# Patient Record
Sex: FEMALE | Race: WHITE | NOT HISPANIC OR LATINO | URBAN - METROPOLITAN AREA
[De-identification: names, ages, dates, MRNs, and addresses within clinical notes are randomized per-mention and may not be internally consistent; named-entity substitution may affect disease eponyms.]

---

## 2023-02-24 PROBLEM — M79.89 SOFT TISSUE MASS: Status: ACTIVE | Noted: 2023-02-24

## 2023-02-24 PROBLEM — E53.8 B12 DEFICIENCY: Status: ACTIVE | Noted: 2023-02-24

## 2023-02-24 PROBLEM — M15.9 GENERALIZED OSTEOARTHRITIS OF MULTIPLE SITES: Status: ACTIVE | Noted: 2023-02-24

## 2023-02-24 PROBLEM — D17.0 LIPOMA OF FOREHEAD: Status: ACTIVE | Noted: 2023-02-24

## 2023-02-24 PROBLEM — J44.9 COPD (CHRONIC OBSTRUCTIVE PULMONARY DISEASE) (MULTI): Status: ACTIVE | Noted: 2023-02-24

## 2023-02-24 PROBLEM — M77.32 CALCANEAL SPUR, LEFT: Status: ACTIVE | Noted: 2023-02-24

## 2023-02-24 PROBLEM — M25.552 LEFT HIP PAIN: Status: ACTIVE | Noted: 2023-02-24

## 2023-02-24 PROBLEM — M54.10 RADICULOPATHY OF ARM: Status: ACTIVE | Noted: 2023-02-24

## 2023-02-24 PROBLEM — M25.511 RIGHT SHOULDER PAIN: Status: ACTIVE | Noted: 2023-02-24

## 2023-02-24 PROBLEM — R53.83 FATIGUE: Status: ACTIVE | Noted: 2023-02-24

## 2023-02-24 PROBLEM — K21.9 GERD (GASTROESOPHAGEAL REFLUX DISEASE): Status: ACTIVE | Noted: 2023-02-24

## 2023-02-24 PROBLEM — M47.812 OSTEOARTHRITIS OF NECK: Status: ACTIVE | Noted: 2023-02-24

## 2023-02-24 PROBLEM — F32.A DEPRESSION: Status: ACTIVE | Noted: 2023-02-24

## 2023-02-24 PROBLEM — E07.9 LESION OF THYROID GLAND: Status: ACTIVE | Noted: 2023-02-24

## 2023-02-24 PROBLEM — M25.561 KNEE PAIN, RIGHT: Status: ACTIVE | Noted: 2023-02-24

## 2023-02-24 PROBLEM — L98.9 BENIGN SKIN GROWTH: Status: ACTIVE | Noted: 2023-02-24

## 2023-02-24 PROBLEM — D50.9 IRON DEFICIENCY ANEMIA: Status: ACTIVE | Noted: 2023-02-24

## 2023-02-24 PROBLEM — D17.21 LIPOMA OF RIGHT UPPER EXTREMITY: Status: ACTIVE | Noted: 2023-02-24

## 2023-02-24 PROBLEM — K62.5 BRIGHT RED RECTAL BLEEDING: Status: ACTIVE | Noted: 2023-02-24

## 2023-02-24 PROBLEM — E11.9 DIABETES MELLITUS, TYPE 2 (MULTI): Status: ACTIVE | Noted: 2023-02-24

## 2023-02-24 PROBLEM — E55.9 VITAMIN D DEFICIENCY: Status: ACTIVE | Noted: 2023-02-24

## 2023-02-24 PROBLEM — E83.42 HYPOMAGNESEMIA: Status: ACTIVE | Noted: 2023-02-24

## 2023-02-24 PROBLEM — M54.50 LOW BACK PAIN: Status: ACTIVE | Noted: 2023-02-24

## 2023-02-24 PROBLEM — M79.672 LEFT FOOT PAIN: Status: ACTIVE | Noted: 2023-02-24

## 2023-02-24 PROBLEM — I48.91 ATRIAL FIBRILLATION (MULTI): Status: ACTIVE | Noted: 2023-02-24

## 2023-02-24 PROBLEM — N64.4 MASTODYNIA OF LEFT BREAST: Status: ACTIVE | Noted: 2023-02-24

## 2023-02-24 PROBLEM — G89.29 CHRONIC RIGHT-SIDED THORACIC BACK PAIN: Status: ACTIVE | Noted: 2023-02-24

## 2023-02-24 PROBLEM — R93.7 ABNORMAL X-RAY OF CERVICAL SPINE: Status: ACTIVE | Noted: 2023-02-24

## 2023-02-24 PROBLEM — N28.9 RENAL INSUFFICIENCY: Status: ACTIVE | Noted: 2023-02-24

## 2023-02-24 PROBLEM — E11.22 CKD STAGE 3 SECONDARY TO DIABETES (MULTI): Status: ACTIVE | Noted: 2023-02-24

## 2023-02-24 PROBLEM — M23.91 INTERNAL DERANGEMENT OF RIGHT KNEE: Status: ACTIVE | Noted: 2023-02-24

## 2023-02-24 PROBLEM — I10 HTN (HYPERTENSION): Status: ACTIVE | Noted: 2023-02-24

## 2023-02-24 PROBLEM — M54.6 CHRONIC RIGHT-SIDED THORACIC BACK PAIN: Status: ACTIVE | Noted: 2023-02-24

## 2023-02-24 PROBLEM — E04.2 MULTINODULAR GOITER: Status: ACTIVE | Noted: 2023-02-24

## 2023-02-24 PROBLEM — I51.7 CARDIOMEGALY: Status: ACTIVE | Noted: 2023-02-24

## 2023-02-24 PROBLEM — E78.5 HYPERLIPIDEMIA: Status: ACTIVE | Noted: 2023-02-24

## 2023-02-24 PROBLEM — T07.XXXA MULTIPLE CONTUSIONS: Status: ACTIVE | Noted: 2023-02-24

## 2023-02-24 PROBLEM — N18.30 CKD STAGE 3 SECONDARY TO DIABETES (MULTI): Status: ACTIVE | Noted: 2023-02-24

## 2023-02-24 PROBLEM — E66.01 MORBID OBESITY WITH BMI OF 50.0-59.9, ADULT (MULTI): Status: ACTIVE | Noted: 2023-02-24

## 2023-02-24 PROBLEM — M54.2 NECK PAIN ON RIGHT SIDE: Status: ACTIVE | Noted: 2023-02-24

## 2023-02-24 PROBLEM — R50.9 FEVER OF UNKNOWN ORIGIN (FUO): Status: ACTIVE | Noted: 2023-02-24

## 2023-02-24 LAB
ALANINE AMINOTRANSFERASE (SGPT) (U/L) IN SER/PLAS: 19 U/L (ref 7–45)
ALBUMIN (G/DL) IN SER/PLAS: 4.3 G/DL (ref 3.4–5)
ALKALINE PHOSPHATASE (U/L) IN SER/PLAS: 64 U/L (ref 33–136)
ANION GAP IN SER/PLAS: 14 MMOL/L (ref 10–20)
ASPARTATE AMINOTRANSFERASE (SGOT) (U/L) IN SER/PLAS: 23 U/L (ref 9–39)
BILIRUBIN TOTAL (MG/DL) IN SER/PLAS: 0.4 MG/DL (ref 0–1.2)
CALCIDIOL (25 OH VITAMIN D3) (NG/ML) IN SER/PLAS: 55 NG/ML
CALCIUM (MG/DL) IN SER/PLAS: 9.7 MG/DL (ref 8.6–10.3)
CARBON DIOXIDE, TOTAL (MMOL/L) IN SER/PLAS: 27 MMOL/L (ref 21–32)
CHLORIDE (MMOL/L) IN SER/PLAS: 102 MMOL/L (ref 98–107)
CHOLESTEROL (MG/DL) IN SER/PLAS: 153 MG/DL (ref 0–199)
CHOLESTEROL IN HDL (MG/DL) IN SER/PLAS: 50.3 MG/DL
CHOLESTEROL/HDL RATIO: 3
COBALAMIN (VITAMIN B12) (PG/ML) IN SER/PLAS: >7500 PG/ML (ref 211–911)
CREATININE (MG/DL) IN SER/PLAS: 0.89 MG/DL (ref 0.5–1.05)
ERYTHROCYTE DISTRIBUTION WIDTH (RATIO) BY AUTOMATED COUNT: 14.4 % (ref 11.5–14.5)
ERYTHROCYTE MEAN CORPUSCULAR HEMOGLOBIN CONCENTRATION (G/DL) BY AUTOMATED: 31.1 G/DL (ref 32–36)
ERYTHROCYTE MEAN CORPUSCULAR VOLUME (FL) BY AUTOMATED COUNT: 90 FL (ref 80–100)
ERYTHROCYTES (10*6/UL) IN BLOOD BY AUTOMATED COUNT: 4.16 X10E12/L (ref 4–5.2)
GFR FEMALE: 70 ML/MIN/1.73M2
GLUCOSE (MG/DL) IN SER/PLAS: 200 MG/DL (ref 74–99)
HEMATOCRIT (%) IN BLOOD BY AUTOMATED COUNT: 37.6 % (ref 36–46)
HEMOGLOBIN (G/DL) IN BLOOD: 11.7 G/DL (ref 12–16)
INR IN PPP BY COAGULATION ASSAY: 1.6 (ref 0.9–1.1)
LDL: 66 MG/DL (ref 0–99)
LEUKOCYTES (10*3/UL) IN BLOOD BY AUTOMATED COUNT: 5.1 X10E9/L (ref 4.4–11.3)
PLATELETS (10*3/UL) IN BLOOD AUTOMATED COUNT: 206 X10E9/L (ref 150–450)
POTASSIUM (MMOL/L) IN SER/PLAS: 4.1 MMOL/L (ref 3.5–5.3)
PROTEIN TOTAL: 7 G/DL (ref 6.4–8.2)
PROTHROMBIN TIME (PT) IN PPP BY COAGULATION ASSAY: 18.7 SEC (ref 9.8–13.4)
SODIUM (MMOL/L) IN SER/PLAS: 139 MMOL/L (ref 136–145)
TRIGLYCERIDE (MG/DL) IN SER/PLAS: 186 MG/DL (ref 0–149)
UREA NITROGEN (MG/DL) IN SER/PLAS: 17 MG/DL (ref 6–23)
VLDL: 37 MG/DL (ref 0–40)

## 2023-02-24 RX ORDER — WARFARIN 4 MG/1
1 TABLET ORAL DAILY
COMMUNITY
End: 2023-05-17 | Stop reason: DRUGHIGH

## 2023-02-24 RX ORDER — HYDROCHLOROTHIAZIDE 25 MG/1
25 TABLET ORAL DAILY
COMMUNITY
Start: 2017-01-12 | End: 2023-09-05

## 2023-02-24 RX ORDER — CALCIUM CARBONATE 600 MG
2 TABLET ORAL DAILY
COMMUNITY
Start: 2022-02-22

## 2023-02-24 RX ORDER — FERROUS SULFATE 325(65) MG
1 TABLET ORAL DAILY
COMMUNITY
Start: 2018-02-05

## 2023-02-24 RX ORDER — CYANOCOBALAMIN 1000 UG/ML
1000 INJECTION, SOLUTION INTRAMUSCULAR; SUBCUTANEOUS
COMMUNITY
Start: 2021-07-20

## 2023-02-24 RX ORDER — LANOLIN ALCOHOL/MO/W.PET/CERES
1 CREAM (GRAM) TOPICAL DAILY
COMMUNITY
Start: 2020-10-26

## 2023-02-24 RX ORDER — SOTALOL HYDROCHLORIDE 120 MG/1
1 TABLET ORAL 2 TIMES DAILY
COMMUNITY
Start: 2017-01-12 | End: 2023-03-15

## 2023-02-24 RX ORDER — ROSUVASTATIN CALCIUM 20 MG/1
1 TABLET, COATED ORAL EVERY EVENING
COMMUNITY
Start: 2016-03-15 | End: 2023-06-09

## 2023-02-24 RX ORDER — SERTRALINE HYDROCHLORIDE 100 MG/1
200 TABLET, FILM COATED ORAL NIGHTLY
COMMUNITY
Start: 2017-01-12 | End: 2023-03-14

## 2023-02-24 RX ORDER — VIT C/E/ZN/COPPR/LUTEIN/ZEAXAN 250MG-90MG
1 CAPSULE ORAL DAILY
COMMUNITY
Start: 2020-10-26

## 2023-02-24 RX ORDER — PANTOPRAZOLE SODIUM 40 MG/1
TABLET, DELAYED RELEASE ORAL
COMMUNITY
Start: 2017-10-15

## 2023-02-24 RX ORDER — AMLODIPINE BESYLATE 5 MG/1
5 TABLET ORAL DAILY
COMMUNITY
Start: 2019-07-22 | End: 2023-03-15

## 2023-02-24 RX ORDER — GLIPIZIDE 5 MG/1
2 TABLET ORAL 2 TIMES DAILY
COMMUNITY
Start: 2017-01-26 | End: 2023-05-26 | Stop reason: SDUPTHER

## 2023-02-24 RX ORDER — BLOOD SUGAR DIAGNOSTIC
STRIP MISCELLANEOUS
COMMUNITY
Start: 2018-03-13

## 2023-02-24 RX ORDER — ASPIRIN 81 MG/1
1 TABLET ORAL DAILY
COMMUNITY
Start: 2021-07-20

## 2023-02-24 RX ORDER — METFORMIN HYDROCHLORIDE 500 MG/1
TABLET ORAL
COMMUNITY
Start: 2017-01-12 | End: 2023-03-15

## 2023-02-24 RX ORDER — PIOGLITAZONEHYDROCHLORIDE 45 MG/1
45 TABLET ORAL DAILY
COMMUNITY
Start: 2017-12-28 | End: 2023-05-12

## 2023-02-24 RX ORDER — METAXALONE 800 MG/1
1 TABLET ORAL 3 TIMES DAILY PRN
COMMUNITY
Start: 2022-02-22

## 2023-02-24 RX ORDER — HUMAN INSULIN 100 [IU]/ML
INJECTION, SOLUTION SUBCUTANEOUS
COMMUNITY
Start: 2019-11-11

## 2023-02-24 RX ORDER — ISOPROPYL ALCOHOL 70 ML/100ML
SWAB TOPICAL
COMMUNITY

## 2023-02-24 RX ORDER — OXYCODONE HYDROCHLORIDE 5 MG/1
CAPSULE ORAL EVERY 6 HOURS PRN
COMMUNITY
Start: 2022-02-22

## 2023-02-24 RX ORDER — MULTIVIT-MIN/FA/LYCOPEN/LUTEIN .4-300-25
1 TABLET ORAL DAILY
COMMUNITY
Start: 2022-02-22

## 2023-02-24 RX ORDER — BLOOD-GLUCOSE CONTROL, NORMAL
EACH MISCELLANEOUS
COMMUNITY

## 2023-02-24 RX ORDER — BLOOD SUGAR DIAGNOSTIC
STRIP MISCELLANEOUS
COMMUNITY
Start: 2020-07-21

## 2023-03-08 ENCOUNTER — TELEPHONE (OUTPATIENT)
Dept: PRIMARY CARE | Facility: CLINIC | Age: 68
End: 2023-03-08
Payer: MEDICARE

## 2023-03-13 ENCOUNTER — OFFICE VISIT (OUTPATIENT)
Dept: PRIMARY CARE | Facility: CLINIC | Age: 68
End: 2023-03-13
Payer: MEDICARE

## 2023-03-13 VITALS
DIASTOLIC BLOOD PRESSURE: 82 MMHG | WEIGHT: 293 LBS | SYSTOLIC BLOOD PRESSURE: 155 MMHG | OXYGEN SATURATION: 95 % | TEMPERATURE: 97.9 F | RESPIRATION RATE: 16 BRPM | BODY MASS INDEX: 57.93 KG/M2 | HEART RATE: 60 BPM

## 2023-03-13 DIAGNOSIS — L03.116 CELLULITIS AND ABSCESS OF LEFT LEG: Primary | ICD-10-CM

## 2023-03-13 DIAGNOSIS — L02.416 CELLULITIS AND ABSCESS OF LEFT LEG: Primary | ICD-10-CM

## 2023-03-13 PROCEDURE — 99213 OFFICE O/P EST LOW 20 MIN: CPT | Performed by: FAMILY MEDICINE

## 2023-03-13 RX ORDER — CEPHALEXIN 500 MG/1
500 CAPSULE ORAL 3 TIMES DAILY
Qty: 30 CAPSULE | Refills: 0 | Status: SHIPPED | OUTPATIENT
Start: 2023-03-13 | End: 2023-03-23

## 2023-03-13 NOTE — PROGRESS NOTES
Subjective   Patient ID: Bree Elam is a 67 y.o. female who presents for Mass (Painful lump/sore on upper outer left thigh. X 4 days).    HPI   Patient comes in today complaining of a painfully sore lump on the upper outer left thigh that has been going on for at least 4 days.  She states it started out as more of a small red bump and she has pictures on her phone of the area.  It has progressed and now has a surrounding hard red area and is tender to touch.  She is otherwise without complaints.  Review of Systems   All other systems reviewed and are negative.      Objective   /82   Pulse 60   Temp 36.6 °C (97.9 °F)   Resp 16   Wt (!) 148 kg (327 lb)   LMP  (LMP Unknown)   SpO2 95%   BMI 57.93 kg/m²     Physical Exam  Vitals reviewed.   Constitutional:       Appearance: She is well-developed. She is obese.      Comments: Patient is super morbidly obese   HENT:      Head: Normocephalic and atraumatic.      Right Ear: Tympanic membrane, ear canal and external ear normal.      Left Ear: Tympanic membrane, ear canal and external ear normal.      Nose: Nose normal.      Mouth/Throat:      Mouth: Mucous membranes are moist.      Pharynx: Oropharynx is clear.   Eyes:      Extraocular Movements: Extraocular movements intact.      Conjunctiva/sclera: Conjunctivae normal.      Pupils: Pupils are equal, round, and reactive to light.   Cardiovascular:      Rate and Rhythm: Normal rate and regular rhythm.      Heart sounds: Normal heart sounds. No murmur heard.  Pulmonary:      Effort: Pulmonary effort is normal.      Breath sounds: Normal breath sounds. No wheezing.   Abdominal:      General: Abdomen is flat. Bowel sounds are normal.      Palpations: Abdomen is soft.   Musculoskeletal:         General: Swelling and tenderness (Patient has red indurated area on upper outer left thigh which is tender to touch and approximately an inch in diameter.) present. Normal range of motion.   Skin:     General: Skin is  warm and dry.   Neurological:      General: No focal deficit present.      Mental Status: She is alert and oriented to person, place, and time. Mental status is at baseline.   Psychiatric:         Mood and Affect: Mood normal.         Behavior: Behavior normal.         Thought Content: Thought content normal.         Judgment: Judgment normal.         Assessment/Plan   Problem List Items Addressed This Visit    None  Visit Diagnoses       Cellulitis and abscess of left leg    -  Primary    Relevant Medications    cephalexin (Keflex) 500 mg capsule        Use warm compresses on affected area for 10-15 minutes twice a day for the next 7-10 days. May use gentle pressure along the sides of the wound to exude any pus from the lesion.  Take antibiotics as directed.  Use Advil/Aleve or Tylenol as directed for pain and swelling.  RTC in 10 days for recheck if not better, sooner if condition worsens.  Medication list reconciled.  Thank you for visiting today!      Professional services: Some of this note was completed using Dragon voice technology and sometimes the software misinterprets words. This may include unintended errors with respect to translation of words, typographical errors or grammar errors which may not have been identified prior to finalization of the chart note. Please take this into account when reading the note. Thank you.

## 2023-03-14 DIAGNOSIS — F32.A DEPRESSION, UNSPECIFIED DEPRESSION TYPE: Primary | ICD-10-CM

## 2023-03-14 RX ORDER — SERTRALINE HYDROCHLORIDE 100 MG/1
TABLET, FILM COATED ORAL
Qty: 180 TABLET | Refills: 0 | Status: SHIPPED | OUTPATIENT
Start: 2023-03-14 | End: 2023-06-09

## 2023-03-24 ENCOUNTER — OFFICE VISIT (OUTPATIENT)
Dept: PRIMARY CARE | Facility: CLINIC | Age: 68
End: 2023-03-24
Payer: MEDICARE

## 2023-03-24 VITALS
TEMPERATURE: 98.3 F | WEIGHT: 293 LBS | OXYGEN SATURATION: 94 % | BODY MASS INDEX: 57.39 KG/M2 | DIASTOLIC BLOOD PRESSURE: 81 MMHG | SYSTOLIC BLOOD PRESSURE: 159 MMHG | RESPIRATION RATE: 20 BRPM | HEART RATE: 60 BPM

## 2023-03-24 DIAGNOSIS — F32.A DEPRESSION, UNSPECIFIED DEPRESSION TYPE: ICD-10-CM

## 2023-03-24 DIAGNOSIS — I48.91 ATRIAL FIBRILLATION, UNSPECIFIED TYPE (MULTI): ICD-10-CM

## 2023-03-24 DIAGNOSIS — L03.116 CELLULITIS AND ABSCESS OF LEFT LEG: ICD-10-CM

## 2023-03-24 DIAGNOSIS — L02.416 CELLULITIS AND ABSCESS OF LEFT LEG: ICD-10-CM

## 2023-03-24 DIAGNOSIS — F43.9 STRESS AT HOME: ICD-10-CM

## 2023-03-24 DIAGNOSIS — I10 HYPERTENSION, UNSPECIFIED TYPE: ICD-10-CM

## 2023-03-24 DIAGNOSIS — Z79.01 WARFARIN ANTICOAGULATION: Primary | ICD-10-CM

## 2023-03-24 PROCEDURE — 85610 PROTHROMBIN TIME: CPT | Performed by: FAMILY MEDICINE

## 2023-03-24 PROCEDURE — 36416 COLLJ CAPILLARY BLOOD SPEC: CPT | Performed by: FAMILY MEDICINE

## 2023-03-24 PROCEDURE — 99214 OFFICE O/P EST MOD 30 MIN: CPT | Performed by: FAMILY MEDICINE

## 2023-03-24 RX ORDER — AMLODIPINE BESYLATE 10 MG/1
10 TABLET ORAL DAILY
COMMUNITY
End: 2023-03-24

## 2023-03-24 RX ORDER — CEPHALEXIN 500 MG/1
500 CAPSULE ORAL 3 TIMES DAILY
Qty: 21 CAPSULE | Refills: 0 | Status: SHIPPED | OUTPATIENT
Start: 2023-03-24 | End: 2023-03-31

## 2023-03-24 RX ORDER — AMLODIPINE BESYLATE 5 MG/1
10 TABLET ORAL DAILY
Qty: 90 TABLET | Refills: 1 | Status: CANCELLED | OUTPATIENT
Start: 2023-03-24

## 2023-03-24 RX ORDER — AMLODIPINE BESYLATE 5 MG/1
10 TABLET ORAL DAILY
Qty: 90 TABLET | Refills: 1
Start: 2023-03-24 | End: 2023-05-17 | Stop reason: SDUPTHER

## 2023-03-24 NOTE — PROGRESS NOTES
Subjective   Patient ID: Bree Elam is a 67 y.o. female who presents for Follow-up (For INR ) and B12 Injection.    HPI   Patient comes in today for follow-up on knee cellulitis of her left thigh as well as recheck of the INR.  Her INR was good today at 2.4.  The cellulitis is improving but not yet resolved.  She does have some stress at home as her son recently moved in with her.    3/13/2023  Patient comes in today complaining of a painfully sore lump on the upper outer left thigh that has been going on for at least 4 days.  She states it started out as more of a small red bump and she has pictures on her phone of the area.  It has progressed and now has a surrounding hard red area and is tender to touch.  She is otherwise without complaints.  Review of Systems   All other systems reviewed and are negative.      Objective   /81   Pulse 60   Temp 36.8 °C (98.3 °F)   Resp 20   Wt (!) 147 kg (324 lb)   LMP  (LMP Unknown)   SpO2 94%   BMI 57.39 kg/m²     Physical Exam  Constitutional:       Appearance: She is well-developed. She is obese.   HENT:      Head: Normocephalic and atraumatic.      Right Ear: Tympanic membrane, ear canal and external ear normal.      Left Ear: Tympanic membrane, ear canal and external ear normal.      Nose: Nose normal.      Mouth/Throat:      Mouth: Mucous membranes are moist.      Pharynx: Oropharynx is clear.   Eyes:      Extraocular Movements: Extraocular movements intact.      Conjunctiva/sclera: Conjunctivae normal.      Pupils: Pupils are equal, round, and reactive to light.   Cardiovascular:      Rate and Rhythm: Normal rate and regular rhythm.      Heart sounds: Normal heart sounds. No murmur heard.  Pulmonary:      Effort: Pulmonary effort is normal.      Breath sounds: Normal breath sounds. No wheezing.   Abdominal:      General: Abdomen is flat. Bowel sounds are normal.      Palpations: Abdomen is soft.   Musculoskeletal:         General: Normal range of  motion.   Skin:     General: Skin is warm and dry.      Findings: Erythema (Resolving cellulitis left anterior thigh) present.   Neurological:      General: No focal deficit present.      Mental Status: She is alert and oriented to person, place, and time. Mental status is at baseline.   Psychiatric:         Mood and Affect: Mood normal.         Behavior: Behavior normal.         Thought Content: Thought content normal.         Judgment: Judgment normal.         Assessment/Plan   Problem List Items Addressed This Visit       Atrial fibrillation (CMS/HCC) - Primary    Relevant Medications    amLODIPine (Norvasc) 5 mg tablet    Depression    HTN (hypertension)    Relevant Medications    amLODIPine (Norvasc) 5 mg tablet    Cellulitis and abscess of left leg    Relevant Medications    cephalexin (Keflex) 500 mg capsule    Stress at home   Take new dose of amlodipine medication as directed.  Continue other medications as directed.  RTC in one month for recheck, sooner should problems arise.  Medication list reconciled.  Thank you for visiting today!      Professional services: Some of this note was completed using Dragon voice technology and sometimes the software misinterprets words. This may include unintended errors with respect to translation of words, typographical errors or grammar errors which may not have been identified prior to finalization of the chart note. Please take this into account when reading the note. Thank you.

## 2023-03-27 PROBLEM — F43.9 STRESS AT HOME: Status: ACTIVE | Noted: 2023-03-27

## 2023-03-28 LAB — POC INR: 2.4 (ref 0.9–1.1)

## 2023-04-17 ENCOUNTER — OFFICE VISIT (OUTPATIENT)
Dept: PRIMARY CARE | Facility: CLINIC | Age: 68
End: 2023-04-17
Payer: MEDICARE

## 2023-04-17 VITALS
OXYGEN SATURATION: 94 % | DIASTOLIC BLOOD PRESSURE: 76 MMHG | BODY MASS INDEX: 57.93 KG/M2 | SYSTOLIC BLOOD PRESSURE: 184 MMHG | WEIGHT: 293 LBS | RESPIRATION RATE: 20 BRPM | HEART RATE: 60 BPM | TEMPERATURE: 98.1 F

## 2023-04-17 DIAGNOSIS — I48.91 ATRIAL FIBRILLATION, UNSPECIFIED TYPE (MULTI): ICD-10-CM

## 2023-04-17 DIAGNOSIS — R31.9 URINARY TRACT INFECTION WITH HEMATURIA, SITE UNSPECIFIED: Primary | ICD-10-CM

## 2023-04-17 DIAGNOSIS — N39.0 URINARY TRACT INFECTION WITH HEMATURIA, SITE UNSPECIFIED: Primary | ICD-10-CM

## 2023-04-17 LAB
POC APPEARANCE, URINE: CLEAR
POC BILIRUBIN, URINE: NEGATIVE
POC BLOOD, URINE: ABNORMAL
POC COLOR, URINE: ABNORMAL
POC GLUCOSE, URINE: NEGATIVE MG/DL
POC INR: 4.8 (ref 0.9–1.1)
POC KETONES, URINE: NEGATIVE MG/DL
POC LEUKOCYTES, URINE: ABNORMAL
POC NITRITE,URINE: NEGATIVE
POC PH, URINE: 6.5 PH
POC PROTEIN, URINE: NEGATIVE MG/DL
POC SPECIFIC GRAVITY, URINE: 1.01
POC UROBILINOGEN, URINE: 0.2 EU/DL

## 2023-04-17 PROCEDURE — 99214 OFFICE O/P EST MOD 30 MIN: CPT | Performed by: FAMILY MEDICINE

## 2023-04-17 PROCEDURE — 81003 URINALYSIS AUTO W/O SCOPE: CPT | Performed by: FAMILY MEDICINE

## 2023-04-17 PROCEDURE — 85610 PROTHROMBIN TIME: CPT | Performed by: FAMILY MEDICINE

## 2023-04-17 PROCEDURE — 87086 URINE CULTURE/COLONY COUNT: CPT

## 2023-04-17 RX ORDER — CIPROFLOXACIN 500 MG/1
500 TABLET ORAL 2 TIMES DAILY
Qty: 14 TABLET | Refills: 0 | Status: SHIPPED | OUTPATIENT
Start: 2023-04-17 | End: 2023-04-24

## 2023-04-17 NOTE — PROGRESS NOTES
Subjective   Patient ID: Bree Elam is a 67 y.o. female who presents for Follow-up and UTI (Since Friday afternoon, frequency and pain).    HPI   Patient comes in today complaining of urinary tract symptoms.  She is having urinary frequency, urgency and burning.  She denies flank pain or other symptoms.  She has been having the symptoms for the past few days.    Also she is here today for recheck of her INR which is high today at 4.8.    3/24/2023  Patient comes in today for follow-up on knee cellulitis of her left thigh as well as recheck of the INR.  Her INR was good today at 2.4.  The cellulitis is improving but not yet resolved.  She does have some stress at home as her son recently moved in with her.    3/13/2023  Patient comes in today complaining of a painfully sore lump on the upper outer left thigh that has been going on for at least 4 days.  She states it started out as more of a small red bump and she has pictures on her phone of the area.  It has progressed and now has a surrounding hard red area and is tender to touch.  She is otherwise without complaints.    Review of Systems   All other systems reviewed and are negative.      Objective   BP (!) 184/76   Pulse 60   Temp 36.7 °C (98.1 °F)   Resp 20   Wt 148 kg (327 lb)   LMP  (LMP Unknown)   SpO2 94%   BMI 57.93 kg/m²     Physical Exam  Vitals reviewed.   Constitutional:       Appearance: She is well-developed.   HENT:      Head: Normocephalic and atraumatic.      Right Ear: Tympanic membrane, ear canal and external ear normal.      Left Ear: Tympanic membrane, ear canal and external ear normal.      Nose: Nose normal.      Mouth/Throat:      Mouth: Mucous membranes are moist.      Pharynx: Oropharynx is clear.   Eyes:      Extraocular Movements: Extraocular movements intact.      Conjunctiva/sclera: Conjunctivae normal.      Pupils: Pupils are equal, round, and reactive to light.   Cardiovascular:      Rate and Rhythm: Normal rate and  regular rhythm.      Heart sounds: Normal heart sounds. No murmur heard.  Pulmonary:      Effort: Pulmonary effort is normal.      Breath sounds: Normal breath sounds. No wheezing.   Abdominal:      General: Abdomen is flat. Bowel sounds are normal.      Palpations: Abdomen is soft.   Musculoskeletal:         General: Normal range of motion.   Skin:     General: Skin is warm and dry.   Neurological:      General: No focal deficit present.      Mental Status: She is alert and oriented to person, place, and time. Mental status is at baseline.   Psychiatric:         Mood and Affect: Mood normal.         Behavior: Behavior normal.         Thought Content: Thought content normal.         Judgment: Judgment normal.         Assessment/Plan   Problem List Items Addressed This Visit       Atrial fibrillation (CMS/HCC)    Relevant Orders    POCT INR manually resulted (Completed)    Urinary tract infection with hematuria - Primary    Relevant Medications    ciprofloxacin (Cipro) 500 mg tablet    Other Relevant Orders    POCT UA Automated manually resulted (Completed)    Urine Culture   Patient encouraged to drink lots of fluids and cranberry juice.  Will notify patient of urine culture results when available.  Use meds as directed.  Return to clinic if symptoms do not improve in 7-10 days, sooner if condition worsens.  Patient instructed to not take her Coumadin today, tomorrow or Wednesday and resume on Thursday.  Medication list reconciled.  Thank you for visiting today!      Professional services: Some of this note was completed using Dragon voice technology and sometimes the software misinterprets words. This may include unintended errors with respect to translation of words, typographical errors or grammar errors which may not have been identified prior to finalization of the chart note. Please take this into account when reading the note. Thank you.

## 2023-04-18 ENCOUNTER — TELEPHONE (OUTPATIENT)
Dept: PRIMARY CARE | Facility: CLINIC | Age: 68
End: 2023-04-18
Payer: MEDICARE

## 2023-04-18 DIAGNOSIS — R31.9 URINARY TRACT INFECTION WITH HEMATURIA, SITE UNSPECIFIED: Primary | ICD-10-CM

## 2023-04-18 DIAGNOSIS — N39.0 URINARY TRACT INFECTION WITH HEMATURIA, SITE UNSPECIFIED: Primary | ICD-10-CM

## 2023-04-18 LAB — URINE CULTURE: NORMAL

## 2023-04-18 NOTE — TELEPHONE ENCOUNTER
Please call [facility] at [phone] they are stating that there is an interaction with the medicine that was prescribed and wants to clarify the medication.    Thank You

## 2023-04-19 NOTE — TELEPHONE ENCOUNTER
[facility] pharmacist states there are interactions with cipro and the warfarin. Please advise.   [Carbohydrate Consistent Diet] : carbohydrate consistent diet [Diabetes Foot Care] : diabetes foot care [Long Term Vascular Complications] : long term vascular complications of diabetes [Self Monitoring of Blood Glucose] : self monitoring of blood glucose [Retinopathy Screening] : Patient was referred to ophthalmology for retinopathy screening [FreeTextEntry1] : 64 y.o. female with h/o hypothyroidism, thyroid nodule, Type 2 DM, adrenal nodule and osteopenia.\par \par 1. Hypothyroidism- Will check TFTs and adjust Synthroid accordingly.\par \par 2. Thyroid nodule- Will check thyroid ultrasound in 2024 given nodule stability. Will continue to monitor.\par \par 3. Type 2 DM- Discussed pathophysiology and reviewed Hba1c and blood glucose goals. Good control with Hba1c of 6.3% today. Recommend increasing Metformin to 500 mg BID. Encouraged carbohydrate consistent diet and exercise. Will check CMP and urine microalb/cr ratio. Recommend follow up with ophthalmology. \par \par 4. Adrenal nodule- Hormonal work up is normal. Will repeat CT scan in 2024.\par \par 5. Osteopenia- DEXA scan performed in April 2021 shows spine -1.3 which is stable and left femoral neck -1.9 with 20% decrease however  total hip 0.8 which is stable. Patient is at average risk of fracture. Recommend monitoring and repeat DEXA scan in 1 year given more significant decrease at femoral neck though given stability of total hip unlikely this is accurate. Encouraged weight bearing activity. Discussed appropriate calcium and vitamin D intake. Will check 25 vitamin D level.\par \par 6. Fatigue- Will check CBC and vitamin B12 \par \par Follow up in 4 to 6 months

## 2023-04-20 RX ORDER — SULFAMETHOXAZOLE AND TRIMETHOPRIM 800; 160 MG/1; MG/1
1 TABLET ORAL 2 TIMES DAILY
Qty: 14 TABLET | Refills: 0 | Status: SHIPPED | OUTPATIENT
Start: 2023-04-20 | End: 2023-04-27

## 2023-04-24 ENCOUNTER — CLINICAL SUPPORT (OUTPATIENT)
Dept: PRIMARY CARE | Facility: CLINIC | Age: 68
End: 2023-04-24
Payer: MEDICARE

## 2023-04-24 DIAGNOSIS — I10 HYPERTENSION, UNSPECIFIED TYPE: ICD-10-CM

## 2023-04-24 DIAGNOSIS — Z79.01 WARFARIN ANTICOAGULATION: Primary | ICD-10-CM

## 2023-04-24 LAB — POC INR: 1.4 (ref 0.9–1.1)

## 2023-04-24 PROCEDURE — 85610 PROTHROMBIN TIME: CPT | Performed by: FAMILY MEDICINE

## 2023-04-24 RX ORDER — AMLODIPINE BESYLATE 5 MG/1
10 TABLET ORAL DAILY
Qty: 90 TABLET | Refills: 1 | Status: CANCELLED | OUTPATIENT
Start: 2023-04-24

## 2023-04-26 ENCOUNTER — TELEPHONE (OUTPATIENT)
Dept: PRIMARY CARE | Facility: CLINIC | Age: 68
End: 2023-04-26
Payer: MEDICARE

## 2023-04-26 NOTE — TELEPHONE ENCOUNTER
[facility] is calling stating that a prescription was sent in for Bactrim DS and that this medication interacts with the warfarin that she is on.  The are asking if it should be filled because the cipro was already given to the patient    Thank You

## 2023-04-26 NOTE — TELEPHONE ENCOUNTER
I did not know the pharmacy had finally filled the Cipro.  No, they should cancel the prescription for the Bactrim.  Thank you.

## 2023-04-28 ENCOUNTER — TELEPHONE (OUTPATIENT)
Dept: PRIMARY CARE | Facility: CLINIC | Age: 68
End: 2023-04-28
Payer: MEDICARE

## 2023-04-28 NOTE — TELEPHONE ENCOUNTER
----- Message from Edis Patel DO sent at 4/26/2023  7:36 AM EDT -----  Regarding: INR  Please notify patient of low INR.  Have her resume Coumadin as she was taking and recheck in 1 month.  ----- Message -----  From: Steve Strickland MA  Sent: 4/24/2023   1:10 PM EDT  To: Edis Patel DO

## 2023-05-12 DIAGNOSIS — E11.9 TYPE 2 DIABETES MELLITUS WITHOUT COMPLICATION, WITHOUT LONG-TERM CURRENT USE OF INSULIN (MULTI): Primary | ICD-10-CM

## 2023-05-12 RX ORDER — PIOGLITAZONEHYDROCHLORIDE 45 MG/1
TABLET ORAL
Qty: 90 TABLET | Refills: 1 | Status: SHIPPED | OUTPATIENT
Start: 2023-05-12 | End: 2023-11-14

## 2023-05-12 NOTE — TELEPHONE ENCOUNTER
Requested Prescriptions     Pending Prescriptions Disp Refills    pioglitazone (Actos) 45 mg tablet [Pharmacy Med Name: Pioglitazone HCl 45 MG Oral Tablet] 90 tablet 1     Sig: Take 1 tablet by mouth once daily

## 2023-05-17 ENCOUNTER — OFFICE VISIT (OUTPATIENT)
Dept: PRIMARY CARE | Facility: CLINIC | Age: 68
End: 2023-05-17
Payer: MEDICARE

## 2023-05-17 VITALS
RESPIRATION RATE: 20 BRPM | WEIGHT: 293 LBS | SYSTOLIC BLOOD PRESSURE: 114 MMHG | TEMPERATURE: 98.3 F | DIASTOLIC BLOOD PRESSURE: 77 MMHG | OXYGEN SATURATION: 93 % | HEART RATE: 60 BPM | BODY MASS INDEX: 57.04 KG/M2

## 2023-05-17 DIAGNOSIS — Z79.01 WARFARIN ANTICOAGULATION: Primary | ICD-10-CM

## 2023-05-17 DIAGNOSIS — M79.89 PAIN AND SWELLING OF RIGHT LOWER LEG: ICD-10-CM

## 2023-05-17 DIAGNOSIS — I10 HYPERTENSION, UNSPECIFIED TYPE: ICD-10-CM

## 2023-05-17 DIAGNOSIS — M79.661 PAIN AND SWELLING OF RIGHT LOWER LEG: ICD-10-CM

## 2023-05-17 DIAGNOSIS — E53.8 B12 DEFICIENCY: ICD-10-CM

## 2023-05-17 DIAGNOSIS — I48.91 ATRIAL FIBRILLATION, UNSPECIFIED TYPE (MULTI): ICD-10-CM

## 2023-05-17 DIAGNOSIS — I48.91 ATRIAL FIBRILLATION, UNSPECIFIED TYPE (MULTI): Primary | ICD-10-CM

## 2023-05-17 DIAGNOSIS — E11.9 TYPE 2 DIABETES MELLITUS WITHOUT COMPLICATION, WITHOUT LONG-TERM CURRENT USE OF INSULIN (MULTI): ICD-10-CM

## 2023-05-17 LAB — POC INR: 4.5 (ref 0.9–1.1)

## 2023-05-17 PROCEDURE — 3074F SYST BP LT 130 MM HG: CPT | Performed by: FAMILY MEDICINE

## 2023-05-17 PROCEDURE — 3078F DIAST BP <80 MM HG: CPT | Performed by: FAMILY MEDICINE

## 2023-05-17 PROCEDURE — 96372 THER/PROPH/DIAG INJ SC/IM: CPT | Performed by: FAMILY MEDICINE

## 2023-05-17 PROCEDURE — 99214 OFFICE O/P EST MOD 30 MIN: CPT | Performed by: FAMILY MEDICINE

## 2023-05-17 PROCEDURE — 1036F TOBACCO NON-USER: CPT | Performed by: FAMILY MEDICINE

## 2023-05-17 PROCEDURE — 85610 PROTHROMBIN TIME: CPT | Performed by: FAMILY MEDICINE

## 2023-05-17 PROCEDURE — 1159F MED LIST DOCD IN RCRD: CPT | Performed by: FAMILY MEDICINE

## 2023-05-17 RX ORDER — AMLODIPINE BESYLATE 10 MG/1
10 TABLET ORAL DAILY
Qty: 90 TABLET | Refills: 1 | Status: SHIPPED | OUTPATIENT
Start: 2023-05-17 | End: 2023-11-14

## 2023-05-17 RX ORDER — CYANOCOBALAMIN 1000 UG/ML
1000 INJECTION, SOLUTION INTRAMUSCULAR; SUBCUTANEOUS ONCE
Status: COMPLETED | OUTPATIENT
Start: 2023-05-17 | End: 2023-05-17

## 2023-05-17 RX ORDER — WARFARIN 3 MG/1
3 TABLET ORAL EVERY EVENING
Qty: 90 TABLET | Refills: 1 | Status: SHIPPED | OUTPATIENT
Start: 2023-05-17 | End: 2023-05-26 | Stop reason: SDUPTHER

## 2023-05-17 RX ADMIN — CYANOCOBALAMIN 1000 MCG: 1000 INJECTION, SOLUTION INTRAMUSCULAR; SUBCUTANEOUS at 08:54

## 2023-05-17 NOTE — PROGRESS NOTES
Subjective   Patient ID: Bree Elam is a 67 y.o. female who presents for Anticoagulation (Inr fu ).    HPI   Patient comes in today for checkup and monitoring of her medication. She has no voiced complaints and no evidence of bleeding or other problems from the Coumadin.  She was in to see her cardiologist Dr. Pantoja recently and it was recommended that she switch from Coumadin to Eliquis or Xarelto.  She is hesitant about doing so because of their cost.  She would like to think about it.  In the meantime her INR is high today at 4.5.    Patient fell going up her outside cement steps about 2 weeks ago and struck the right tibial region.  She had some swelling and redness initially but it is still painful today and still somewhat swollen.    4/17/2023  Patient comes in today complaining of urinary tract symptoms.  She is having urinary frequency, urgency and burning.  She denies flank pain or other symptoms.  She has been having the symptoms for the past few days.    Also she is here today for recheck of her INR which is high today at 4.8.    3/24/2023  Patient comes in today for follow-up on knee cellulitis of her left thigh as well as recheck of the INR.  Her INR was good today at 2.4.  The cellulitis is improving but not yet resolved.  She does have some stress at home as her son recently moved in with her.    3/13/2023  Patient comes in today complaining of a painfully sore lump on the upper outer left thigh that has been going on for at least 4 days.  She states it started out as more of a small red bump and she has pictures on her phone of the area.  It has progressed and now has a surrounding hard red area and is tender to touch.  She is otherwise without complaints.      2/24/2023  Patient comes in today for Medicare wellness exam and to evaluate several issues. She states she has had a irritated throat for several months along with a very dry mouth and the right side of her neck has some  pain.    She complains of shortness of breath when walking long or short distances. She is faithful in using her CPAP at night.    Patient states she is having problems with her memory. She is having forgetfulness with things she is done, names of things and names of places. She is under new stress as her son has moved in with her. He is working but she feels he has health issues as he is over 400 pounds. He apparently refuses to see a doctor. He has no health insurance.    In 10/2022 she tested Positive for COVID. She was supposed to have a colonoscopy done at that time which she canceled and has not rescheduled.    Patient needs B-12 injection.     7/20/2022  Patient presents today for checkup and refill of meds. She currently is without complaints. She states that she is taking her medicines as directed and is not having any side effects from them. Patient is also due for B12 shot.    6/23/2022  Patient comes in today for checkup and monitoring of her medication. She has no voiced complaints and no evidence of bleeding or other problems from the Coumadin. She did find a bruise on her right lateral flank about 2 weeks ago that was rather large and she has no idea where it came from. Today it is almost dissipated. Will monitor for future bruising. Her INR is 3.1 today.     Patient's depression is currently stable on the 200 mg of sertraline at bedtime. As noted previously she is excited because her daughter and family are going to move back to [place].    5/18/2022  Patient comes in today for checkup and monitoring of her medication. She has no voiced complaints and no evidence of bleeding or other problems from the Coumadin. Her INR today is 2.7. She is excited today because she has learned recently that her daughter in [place] is moving back to [place].    4/20/2022  Patient comes in today for checkup and monitoring of her medication. She has no voiced complaints and no evidence of bleeding or other problems  from the Coumadin. Her INR today is 2.6. Since she was here last she did have a stress test and echocardiogram at [facility] through Dr. Pantoja.    Patient is wondering about using the new keto diet that is out. It uses BHB extract (Beta-hydroxybutyrate) it was on  [employer].    3/23/2022  Patient comes in today for checkup and monitoring of her medication. She has no voiced complaints and no evidence of bleeding or other problems from the Coumadin. Her INR today is 2.2.    Patient has also been experiencing some urinary frequency. She denies blood in the urine. It actually has been a symptom she has been having for some time.    2/23/2022  Patient comes in today for follow-up on her Coumadin but also for Medicare wellness exam. In the interim she was also in the hospital, admitted overnight for observation at [facility]. She tells me that she fell about 2 weeks ago and landed in the snow. A neighbor helped her up and the only thing that she thought she hurt at the time was her pride. The EMS was called and came and checked her out and suggested that she go to the hospital but she refused at that time.     It was not until this past Sunday, 2/20/2022 that she suddenly had severe back pain while shopping. It was so severe that she had difficulty walking and moving. She tried some Tylenol and oxycodone that she had at home but was unable to get comfortable. EMS was called again and this time they did bring her to the ER. She was evaluated with CT scans of the thoracic and lumbar spine and no acute pathology was found. Advanced multilevel degenerative spurring was present. Because of continued significant pain the patient was kept for observation. She was released the next day.    Her INR in the hospital was 3.2 and today it is 3.8.    1/19/2022  Patient comes in today for checkup and monitoring of her medication. She has no voiced complaints and no evidence of bleeding or other problems from the Coumadin.  Her INR level was high today at 3.0. We will have her skip today's dose and resume the same dose tomorrow.    Patient lost her brother Marco, 68 years old, earlier this month. There were a total of 14 siblings in the family, 3 boys have now  and there are 8 boys and 3 girls remaining.    2021  Patient comes in today for Medicare wellness exam. She is currently without complaints. Her depression is stable and she needs refills on her medication. She is also due for B12 injection today. Her labs are due in January. Her INR today is 1.8.    10/13/2021  Patient comes in today for checkup and monitoring of her medication. She has no voiced complaints and no evidence of bleeding or other problems from the Coumadin. Her INR today is 1.8, slightly on the low side. She is in need of a B12 shot today and also is planning on going to [place], [place] next month.    Patient's depression is stable. Patient's chronic kidney disease last checked in April was stable as well at that time. She is due next month for Medicare wellness exam and labs.    2021  Patient comes in today for monthly checkup and monitoring of her Coumadin medication. She has no voiced complaints and no evidence of bleeding or other problems from the Coumadin. She does have a spontaneous conjunctival hemorrhage in the lateral left eye which occurred on Monday and is slowly disappearing.    Patient's chronic kidney disease is stable by last labs done in May and patient's depression is also stable on the sertraline 200 mg at bedtime.    Patient is complaining of some pain in the left upper arm below the shoulder laterally. She denies trauma or injury to the arm.    Patient headed to [place], [place] in November to visit with her daughter and family.    2021  Patient comes in today for checkup and monitoring of her medication. She has no voiced complaints and no evidence of bleeding or other problems from the Coumadin. She did have a UTI back  on 8/6/2021 and completed the antibiotic course and has not had any problems since. Her INR today is low at 1.2. She does not remember if she missed a dose or not. She has been under a lot of stress due to a [place] going [place] Apparently her 80-year-old neighbor who she is very fond of is being forced to move because the new Park owners are not excepting reduced rent from the government. There are also apparently new sewers going in which is creating a lot of noise and mess in the neighborhood.     7/20/2021  Patient comes in today for checkup and monitoring of her medication. She has no voiced complaints and no evidence of bleeding or other problems from the Coumadin. She was in to see her cardiologist Dr. Pantoja last week and everything is stable. Her kidney function is stable as well. Her INR today is 2.0.    6/8/2021  Patient comes in today for checkup and monitoring of her Coumadin. She is doing well with the medicine and not experiencing any side effects.    Patient is experiencing some elements of depression. Being alone, , both of her parents are gone, her air conditioning in her trailer and also in her car just both went out as we are experiencing a current heat wave. She just does not know what to do. After discussion we will try increasing her sertraline to 2 tablets at bedtime.    5/10/2021  Patient comes in today for checkup and monitoring of her medication. She has no voiced complaints and no evidence of bleeding or other problems from the Coumadin.    4/12/2021  Patient comes in today for checkup and monitoring of her medication. She has no voiced complaints and no evidence of bleeding or other problems from the Coumadin. She has had some twinges of discomfort in the left chest but they come and go. They do not travel into her neck or down her left arm. She has recently seen Dr. Pantoja for follow-up on her atrial fibrillation back in January who felt she  was doing well from a cardiac standpoint. She has never had a coronary artery calcification study. We will have her do that today.    3/15/2021  Patient presents today for follow-up on her Coumadin for her atrial fibrillation and also for follow-up from the emergency room last Tuesday, 3/9/2021. She had tripped at home on a rug the day before and landed with her left arm extended over her head. She fell on struck her face. She had no loss of consciousness but had pain in the left shoulder and left elbow and a nosebleed. She had a moderate degree of pain and rather than wait to be seen here she decided to go to the emergency room. A CT of the head without IV contrast was done which was negative. X-ray of the shoulder revealed calcific tendinitis or bursitis in the left shoulder without other bony injury. She was placed in an arm sling and sent home with instructions to follow-up here as well as with Dr. Dickerson for her shoulder.     Today she has her arm out of the sling. She states she could not keep it in there because it was too uncomfortable. She has limited range of motion in the left shoulder and has not yet made an appointment to see Dr. Dickerson.    2/12/2021  Patient comes in today for follow-up on her Coumadin level. It was good today at 2.2. She is continuing to have right lateral back pain and denies any known trauma or injury to that area of the back. She did fall a few days ago getting out of bed but not onto the right side and this was bothering her before then. She denies any injury 2 days ago.    1/15/2021  Patient comes in today for checkup and monitoring of her medication. She has no voiced complaints and no evidence of bleeding or other problems from the Coumadin. Patient has been watching her sugars at home as well and they have been averaging on the high side. Patient states that she is averaging around 200. She also been having pain on and off in her lower right back. It is usually in the evening  and a deep breath makes it hurt.    12/15/2020  Patient presents today for checkup and refill of meds. She currently is without complaints. She states that she is taking her medicines as directed and is not having any side effects from them. Her INR today is good at 2.6. She is alternating 3 and 4 mg of Coumadin. Her sugar and COPD are stable.     11/19/2020  Patient comes in today for follow-up from recent hospitalization. When she was seen last week on a telemedicine visit she appeared very sick and was sent to the ER for evaluation. She was For 24-hour observation and released the next day. She had Covid testing which was negative. She was discharged with diagnosis of viral syndrome. She was released on no medications and instructed to follow up here. She is here today also for Medicare wellness exam.    11/13/2020  Patient is seen today as a telemedicine visit rendered via realtime interactive audio/video [email or web address] services as we are currently in the middle of a coronavirus pandemic worldwide. The patient was informed about the telehealth clinical encounter including benefits to avoiding travel and limitations to the assessment. In person care may be recommended if needed. Telehealth sessions are not being recorded and personal health information is protected.     Patient presents today with a fever and other symptoms. She states she had a fever last night of 101.5. She took some Tylenol and this morning her fever was 101.3 and just before her visit here this afternoon was 99.7. She also complains of a sore throat, severe fatigue, headache, nausea and GERD. She has also been having some dry heaves. Although she denies any direct exposure to the coronavirus she is at high risk due to her severe morbid obesity, COPD, diabetes, hypertension and atrial fibrillation. I have recommended that she go to the ER for further evaluation.    10/048215  Patient comes in today complaining of urinary tract symptoms. She is having  urinary frequency, urgency and burning. She denies flank pain or other symptoms. She has been having symptoms for about a week.    On 10/13/2020 she had an episode of elevated blood sugar of 414. She had received a cortisone shot earlier in the day from Dr. Dickerson in her right shoulder. She called the on-call doctor and was directed to take some extra insulin and a few hours later her sugar had come down to 241. It was 171 this morning.    Patient is due for mammograms as well as repeat lab work. Also because of its expense she wants to get off of Xarelto and go back to taking Coumadin. She plans on doing so in the next few days when she runs out of her Xarelto prescription. She has leftover Coumadin previously and will resume the previous dose.    9/18/2028  Patient comes in today complaining of right shoulder pain. She states it has been going on for about a week. She denies any trauma or injury to the shoulder. She has been able to cut her grass. In taking off her shirt however she has to go from left to right and she can't raise her right shoulder. She has no other complaints at this time.    8/12/2020  Patient is seen today as a telemedicine visit rendered via realtime interactive audio/video [email or web address] services as we are currently in the middle of a coronavirus pandemic worldwide. The patient was informed about the telehealth clinical encounter including benefits to avoiding travel and limitations to the assessment. In person care may be recommended if needed. Telehealth sessions are not being recorded and personal health information is protected.     Patient comes in today with a 2 day history of headache, dizziness, nausea and diarrhea. She denies exposure to the coronavirus. She states she has been afebrile and denies any other symptoms at this time except for some sneezing and a runny nose.    6/30/2020  Patient comes in today for checkup and monitoring of her medication. She has no voiced complaints and no  "evidence of bleeding or other problems from the Coumadin. She is tired of watching her diet based on her Coumadin medication and is requesting possibly a different medicine. She has an upcoming appointment with her cardiologist this Thursday, , and I have asked her to discuss it with him. If she winds up staying on the Coumadin I have recommended that she skip today's dose of Coumadin and starting tomorrow go back to 3 mg daily. She is return to clinic in 2 weeks for recheck.    5/29/2020  Patient comes in today for checkup and monitoring of her medication. She has no voiced complaints and no evidence of bleeding or other problems from the Coumadin. She is feeling okay today except she is tired of being \"cooped up\" from this stay-at-home orders for the coronavirus pandemic. She misses seeing her grandchildren. She states that her son and his wife tested positive for the coronavirus and she was not near them at all and they have recovered. She is requesting information about her immunization record from [facility] but we have never gotten medical records from them. Will send for them again.    4/20/2020  Patient is seen today as a telemedicine visit rendered via realtime interactive audio/video [email or web address] services as we are currently in the middle of a coronavirus pandemic worldwide with stay-at-home orders by the government. Patient presents today for checkup and refill of meds. She currently is complaining of some pain in her right side of her back just beneath her ribs that comes and goes. It has been going on for 1-1/2-2 weeks. Denies changes in bowel movements or blood in the stool or urine. She is on Coumadin chronically. She states that she is taking her medicines as directed and is not having any side effects from them.    3/2/2020  Patient comes in today for follow-up from recent hospital visit. She was seen last week at [facility] after she was complaining of some chest pain at " home. She called her daughter who is a nurse and she recommended that the patient call 911 and take her to the hospital. In the hospital she was seen and evaluated and kept for 24 hours to rule out cardiac chest pain. She was seen by cardiology and after 3 sets of enzymes were negative she was released. He was instructed to follow-up with GI and myself. Since her release she has not had any further chest pain. Her INR today is slightly subtherapeutic at 1.8.    2/3/2020  Pt says its just her monthy visit and she would like to go over CT that she had done as well as get labwork for inr    Patient also brings in her blood sugar log today and she has been averaging 4 units of insulin coverage daily.    1/6/2020  CALOS SMITH presents with complaints of visit for: ( inr ck)   Patient comes in today for follow-up on several issues. First of all patient continues to complain of the rash on the medial side of her left arm near the axilla that has not gone away. It is a flat oval shape area approximately 2 cm x 4 cm that is erythematous but patient denies itching or pain. She has been using Lotrisone cream on it the past month but it has not gone away.    Patient also complaining of pain down the right arm to the hand with numbness and tingling into her fingers. She mentioned this to her cardiologist who recommended she come here for further evaluation. Patient has a history of B-12 deficiency in the past as well as she is diabetic. She claims her sugars have been mostly under 200. Her last hemoglobin A1c was September 2019 at 8. 2%.    11/18/19  Patient comes in today for multiple issues. She first goes complaining of pain in her left foot and left hip. She claims it has been going on for several weeks. She is super morbidly obese.    Patient has been averaging 4-8 units daily with her blood sugar using the sliding scale.    Patient claims she has a lipoma on her right upper arm which did have a previous ultrasound and  she did go to therapy at [facility] for it. She claims she is having difficulty raising the arm due to the cyst. She states she had a large one on the other arm as well and a general surgeon took it off. She states it was intertwined in the muscle.    9/25/19  Patient comes in today for checkup and monitoring of her medication. Her INR level is good today at 2.5. Her only complaint today is some pain she had in her left breast week ago around the nipple. She is concerned because of a strong family history of breast cancer. She did have mammograms in February which were negative.    9/4/19  Patient comes in today for checkup and monitoring of her medication. She has no voiced complaints and no evidence of bleeding or other problems from the Coumadin. She just returned from a trip to [place], [place]. She also has her granddaughter living with her which will make her less lonely. She has also been housesitting a puppy and is anxious to get rid of it.    Patient would like to have some cysts removed on her midforehead and left side of the face.    Patient would like to have a follow-up thyroid ultrasound for her multinodular goiter.    Patient's hemoglobin A1c today is also high at 8.2%.           Review of Systems   All other systems reviewed and are negative.      Objective   LMP  (LMP Unknown)     Physical Exam  Vitals reviewed.   Constitutional:       Appearance: She is morbidly obese.      Comments: Super morbidly obese 67-year-old  female   HENT:      Head: Normocephalic and atraumatic.      Right Ear: Tympanic membrane, ear canal and external ear normal.      Left Ear: Tympanic membrane, ear canal and external ear normal.      Nose: Nose normal.      Mouth/Throat:      Mouth: Mucous membranes are moist.      Pharynx: Oropharynx is clear.   Eyes:      Extraocular Movements: Extraocular movements intact.      Conjunctiva/sclera: Conjunctivae normal.      Pupils: Pupils are equal, round, and reactive to  light.   Cardiovascular:      Rate and Rhythm: Normal rate and regular rhythm.      Heart sounds: Normal heart sounds. No murmur heard.  Pulmonary:      Effort: Pulmonary effort is normal.      Breath sounds: Normal breath sounds. No wheezing.   Abdominal:      General: Abdomen is flat. Bowel sounds are normal.      Palpations: Abdomen is soft.   Musculoskeletal:         General: Tenderness (Mild swelling and tenderness without ecchymosis or erythema of right lower extremity between the ankle and the knee.) present. Normal range of motion.   Skin:     General: Skin is warm and dry.   Neurological:      General: No focal deficit present.      Mental Status: She is alert and oriented to person, place, and time. Mental status is at baseline.   Psychiatric:         Mood and Affect: Mood normal.         Behavior: Behavior normal.         Thought Content: Thought content normal.         Judgment: Judgment normal.         Assessment/Plan   Problem List Items Addressed This Visit       Atrial fibrillation (CMS/HCC)    Relevant Medications    amLODIPine (Norvasc) 10 mg tablet    B12 deficiency    Relevant Medications    cyanocobalamin (Vitamin B-12) injection 1,000 mcg (Completed)    HTN (hypertension)    Relevant Medications    amLODIPine (Norvasc) 10 mg tablet    Warfarin anticoagulation - Primary    Relevant Orders    POCT INR manually resulted (Completed)    Pain and swelling of right lower leg    Relevant Orders    XR tibia fibula right 2 views   Continue Coumadin but at 3 mg daily starting Friday.    Will contact patient with x-ray results when they are available.  RTC in one month for recheck, sooner should problems arise.  B12 shot today.  Medication list reconciled.  Thank you for visiting today!      Professional services: Some of this note was completed using Dragon voice technology and sometimes the software misinterprets words. This may include unintended errors with respect to translation of words,  typographical errors or grammar errors which may not have been identified prior to finalization of the chart note. Please take this into account when reading the note. Thank you.

## 2023-05-17 NOTE — TELEPHONE ENCOUNTER
Patient is requesting a refill on her     Warfarin 3 mg     Sent to [facility] in [place]    Thank You

## 2023-05-25 DIAGNOSIS — E11.22 CKD STAGE 3 SECONDARY TO DIABETES (MULTI): ICD-10-CM

## 2023-05-25 DIAGNOSIS — N18.30 CKD STAGE 3 SECONDARY TO DIABETES (MULTI): ICD-10-CM

## 2023-05-25 DIAGNOSIS — E11.9 TYPE 2 DIABETES MELLITUS WITHOUT COMPLICATION, WITHOUT LONG-TERM CURRENT USE OF INSULIN (MULTI): Primary | ICD-10-CM

## 2023-05-26 NOTE — TELEPHONE ENCOUNTER
Patient is calling to check the status on her warfin refill.  Patient is also requesting a refill on her     Glipizide 5 mg     Sent to [facility] in [place]    Thank You

## 2023-05-26 NOTE — TELEPHONE ENCOUNTER
Requested Prescriptions     Pending Prescriptions Disp Refills    glipiZIDE (Glucotrol) 5 mg tablet 360 tablet 1     Sig: Take 2 tablets (10 mg) by mouth 2 times a day.    warfarin (Coumadin) 3 mg tablet 90 tablet 1     Sig: Take 1 tablet (3 mg) by mouth once daily in the evening.

## 2023-05-27 RX ORDER — GLIPIZIDE 5 MG/1
10 TABLET ORAL 2 TIMES DAILY
Qty: 360 TABLET | Refills: 1 | Status: SHIPPED | OUTPATIENT
Start: 2023-05-27 | End: 2023-11-20

## 2023-05-27 RX ORDER — WARFARIN 3 MG/1
3 TABLET ORAL EVERY EVENING
Qty: 90 TABLET | Refills: 1 | Status: SHIPPED | OUTPATIENT
Start: 2023-05-27 | End: 2023-11-20

## 2023-05-30 RX ORDER — BLOOD SUGAR DIAGNOSTIC
STRIP MISCELLANEOUS
Qty: 100 STRIP | Refills: 11 | Status: SHIPPED | OUTPATIENT
Start: 2023-05-30

## 2023-05-30 NOTE — TELEPHONE ENCOUNTER
Requested Prescriptions     Pending Prescriptions Disp Refills    OneTouch Ultra Test strip [Pharmacy Med Name: OneTouch Ultra Blue In Vitro Strip]  0     Sig: USE 1 STRIP TO CHECK GLUCOSE THREE TIMES DAILY

## 2023-05-31 ENCOUNTER — TELEPHONE (OUTPATIENT)
Dept: PRIMARY CARE | Facility: CLINIC | Age: 68
End: 2023-05-31
Payer: MEDICARE

## 2023-05-31 NOTE — TELEPHONE ENCOUNTER
----- Message from Edis Patel DO sent at 5/23/2023  8:02 AM EDT -----  Regarding: X-ray right lower extremity  Please notify patient of moderate arthritic changes of the right knee and some soft tissue calcifications of the lower leg from old trauma.  Otherwise negative x-ray of the leg.  ----- Message -----  From: [facility] - Radiology Results In  Sent: 5/18/2023   1:27 PM EDT  To: Edis Patel DO

## 2023-06-09 DIAGNOSIS — F32.A DEPRESSION, UNSPECIFIED DEPRESSION TYPE: ICD-10-CM

## 2023-06-09 DIAGNOSIS — E78.5 HYPERLIPIDEMIA, UNSPECIFIED HYPERLIPIDEMIA TYPE: Primary | ICD-10-CM

## 2023-06-09 RX ORDER — ROSUVASTATIN CALCIUM 20 MG/1
20 TABLET, COATED ORAL EVERY EVENING
Qty: 90 TABLET | Refills: 1 | Status: SHIPPED | OUTPATIENT
Start: 2023-06-09 | End: 2023-12-13

## 2023-06-09 RX ORDER — SERTRALINE HYDROCHLORIDE 100 MG/1
200 TABLET, FILM COATED ORAL NIGHTLY
Qty: 180 TABLET | Refills: 1 | Status: SHIPPED | OUTPATIENT
Start: 2023-06-09 | End: 2023-12-13

## 2023-06-09 NOTE — TELEPHONE ENCOUNTER
Requested Prescriptions     Pending Prescriptions Disp Refills    rosuvastatin (Crestor) 20 mg tablet [Pharmacy Med Name: Rosuvastatin Calcium 20 MG Oral Tablet] 90 tablet 1     Sig: Take 1 tablet (20 mg) by mouth once daily in the evening.    sertraline (Zoloft) 100 mg tablet [Pharmacy Med Name: Sertraline HCl 100 MG Oral Tablet] 180 tablet 1     Sig: Take 2 tablets (200 mg) by mouth once daily at bedtime.

## 2023-06-19 ENCOUNTER — OFFICE VISIT (OUTPATIENT)
Dept: PRIMARY CARE | Facility: CLINIC | Age: 68
End: 2023-06-19
Payer: MEDICARE

## 2023-06-19 VITALS
DIASTOLIC BLOOD PRESSURE: 90 MMHG | HEIGHT: 63 IN | SYSTOLIC BLOOD PRESSURE: 140 MMHG | WEIGHT: 293 LBS | BODY MASS INDEX: 51.91 KG/M2 | RESPIRATION RATE: 20 BRPM | TEMPERATURE: 98.3 F | HEART RATE: 60 BPM

## 2023-06-19 DIAGNOSIS — E66.01 MORBID OBESITY WITH BMI OF 50.0-59.9, ADULT (MULTI): ICD-10-CM

## 2023-06-19 DIAGNOSIS — E53.8 B12 DEFICIENCY: ICD-10-CM

## 2023-06-19 DIAGNOSIS — I48.91 ATRIAL FIBRILLATION, UNSPECIFIED TYPE (MULTI): Primary | ICD-10-CM

## 2023-06-19 DIAGNOSIS — F43.9 STRESS AT HOME: ICD-10-CM

## 2023-06-19 LAB — POC INR: 2.3 (ref 0.9–1.1)

## 2023-06-19 PROCEDURE — 96372 THER/PROPH/DIAG INJ SC/IM: CPT | Performed by: FAMILY MEDICINE

## 2023-06-19 PROCEDURE — 85610 PROTHROMBIN TIME: CPT | Performed by: FAMILY MEDICINE

## 2023-06-19 PROCEDURE — 99214 OFFICE O/P EST MOD 30 MIN: CPT | Performed by: FAMILY MEDICINE

## 2023-06-19 RX ORDER — CYANOCOBALAMIN 1000 UG/ML
1000 INJECTION, SOLUTION INTRAMUSCULAR; SUBCUTANEOUS ONCE
Status: COMPLETED | OUTPATIENT
Start: 2023-06-19 | End: 2023-06-19

## 2023-06-19 RX ADMIN — CYANOCOBALAMIN 1000 MCG: 1000 INJECTION, SOLUTION INTRAMUSCULAR; SUBCUTANEOUS at 15:07

## 2023-06-19 NOTE — PROGRESS NOTES
"Subjective   Patient ID: Bree Elam is a 67 y.o. female who presents for Follow-up (1m follow up. Pt says she is here for INR and B12 shot. ).    HPI   Patient comes in today for checkup and monitoring of her medication. She has no voiced complaints and no evidence of bleeding or other problems from the Coumadin.  She did fall on 6/9/2023 but fortunately did not hurt anything.  Her INR was good today at 2.3.    Patient is having some stress at home as her son is living with her.  He is unemployed, morbidly obese and apparently he is not motivated to do anything.      5/17/2023  Patient comes in today for checkup and monitoring of her medication. She has no voiced complaints and no evidence of bleeding or other problems from the Coumadin.  She was in to see her cardiologist Dr. Pantoja recently and it was recommended that she switch from Coumadin to Eliquis or Xarelto.  She is hesitant about doing so because of their cost.  She would like to think about it.  In the meantime her INR is high today at 4.5.    Patient fell going up her outside cement steps about 2 weeks ago and struck the right tibial region.  She had some swelling and redness initially but it is still painful today and still somewhat swollen.    4/17/2023  Patient comes in today complaining of urinary tract symptoms.  She is having urinary frequency, urgency and burning.  She denies flank pain or other symptoms.  She has been having the symptoms for the past few days.    Also she is here today for recheck of her INR which is high today at 4.8.    Review of Systems   All other systems reviewed and are negative.      Objective   /90   Pulse 60   Temp 36.8 °C (98.3 °F)   Resp 20   Ht 1.6 m (5' 3\")   Wt (!) 151 kg (332 lb)   LMP  (LMP Unknown)   BMI 58.81 kg/m²     Physical Exam  Vitals reviewed.   Constitutional:       Appearance: She is morbidly obese.      Comments: Patient is super morbidly obese 67-year-old  female. "   HENT:      Head: Normocephalic and atraumatic.      Right Ear: Tympanic membrane, ear canal and external ear normal.      Left Ear: Tympanic membrane, ear canal and external ear normal.      Nose: Nose normal.      Mouth/Throat:      Mouth: Mucous membranes are moist.      Pharynx: Oropharynx is clear.   Eyes:      Extraocular Movements: Extraocular movements intact.      Conjunctiva/sclera: Conjunctivae normal.      Pupils: Pupils are equal, round, and reactive to light.   Cardiovascular:      Rate and Rhythm: Normal rate and regular rhythm.      Heart sounds: Normal heart sounds. No murmur heard.  Pulmonary:      Effort: Pulmonary effort is normal.      Breath sounds: Normal breath sounds. No wheezing.   Abdominal:      General: Abdomen is flat. Bowel sounds are normal.      Palpations: Abdomen is soft.   Musculoskeletal:         General: Normal range of motion.   Skin:     General: Skin is warm and dry.   Neurological:      General: No focal deficit present.      Mental Status: She is alert and oriented to person, place, and time. Mental status is at baseline.   Psychiatric:         Mood and Affect: Mood normal.         Behavior: Behavior normal.         Thought Content: Thought content normal.         Judgment: Judgment normal.         Assessment/Plan   Problem List Items Addressed This Visit       Atrial fibrillation (CMS/HCC)    Relevant Orders    POCT INR manually resulted (Completed)    B12 deficiency    Morbid obesity with BMI of 50.0-59.9, adult (CMS/HCC) - Primary    Stress at home   Continue all current meds.  RTC in one month for recheck, sooner should problems arise.  Medication list reconciled.  Thank you for visiting today!      Professional services: Some of this note was completed using Dragon voice technology and sometimes the software misinterprets words. This may include unintended errors with respect to translation of words, typographical errors or grammar errors which may not have been  identified prior to finalization of the chart note. Please take this into account when reading the note. Thank you.

## 2023-07-18 ENCOUNTER — TELEPHONE (OUTPATIENT)
Dept: PRIMARY CARE | Facility: CLINIC | Age: 68
End: 2023-07-18
Payer: MEDICARE

## 2023-07-18 NOTE — TELEPHONE ENCOUNTER
Patient is asking for an order for a new c-pap machine from [employer].  Patient stated that an order can be faxed to [phone].  Patient can be reached at [phone].    Thank You

## 2023-07-19 ENCOUNTER — OFFICE VISIT (OUTPATIENT)
Dept: PRIMARY CARE | Facility: CLINIC | Age: 68
End: 2023-07-19
Payer: MEDICARE

## 2023-07-19 ENCOUNTER — LAB (OUTPATIENT)
Dept: LAB | Facility: LAB | Age: 68
End: 2023-07-19
Payer: MEDICARE

## 2023-07-19 VITALS
WEIGHT: 293 LBS | DIASTOLIC BLOOD PRESSURE: 83 MMHG | TEMPERATURE: 97.6 F | BODY MASS INDEX: 58.46 KG/M2 | OXYGEN SATURATION: 94 % | SYSTOLIC BLOOD PRESSURE: 142 MMHG | HEART RATE: 60 BPM | RESPIRATION RATE: 20 BRPM

## 2023-07-19 DIAGNOSIS — D64.9 ANEMIA, UNSPECIFIED TYPE: ICD-10-CM

## 2023-07-19 DIAGNOSIS — E66.01 MORBID OBESITY WITH BMI OF 50.0-59.9, ADULT (MULTI): ICD-10-CM

## 2023-07-19 DIAGNOSIS — M25.561 ACUTE PAIN OF RIGHT KNEE: ICD-10-CM

## 2023-07-19 DIAGNOSIS — E55.9 VITAMIN D DEFICIENCY: ICD-10-CM

## 2023-07-19 DIAGNOSIS — E11.9 TYPE 2 DIABETES MELLITUS WITHOUT COMPLICATION, WITHOUT LONG-TERM CURRENT USE OF INSULIN (MULTI): Primary | ICD-10-CM

## 2023-07-19 DIAGNOSIS — E11.9 TYPE 2 DIABETES MELLITUS WITHOUT COMPLICATION, WITHOUT LONG-TERM CURRENT USE OF INSULIN (MULTI): ICD-10-CM

## 2023-07-19 DIAGNOSIS — E53.8 B12 DEFICIENCY: ICD-10-CM

## 2023-07-19 DIAGNOSIS — I48.91 ATRIAL FIBRILLATION, UNSPECIFIED TYPE (MULTI): ICD-10-CM

## 2023-07-19 DIAGNOSIS — R41.3 SHORT-TERM MEMORY LOSS: ICD-10-CM

## 2023-07-19 DIAGNOSIS — G47.33 OSA ON CPAP: ICD-10-CM

## 2023-07-19 LAB
CALCIDIOL (25 OH VITAMIN D3) (NG/ML) IN SER/PLAS: 50 NG/ML
COBALAMIN (VITAMIN B12) (PG/ML) IN SER/PLAS: 403 PG/ML (ref 211–911)
ERYTHROCYTE DISTRIBUTION WIDTH (RATIO) BY AUTOMATED COUNT: 13.7 % (ref 11.5–14.5)
ERYTHROCYTE MEAN CORPUSCULAR HEMOGLOBIN CONCENTRATION (G/DL) BY AUTOMATED: 31.3 G/DL (ref 32–36)
ERYTHROCYTE MEAN CORPUSCULAR VOLUME (FL) BY AUTOMATED COUNT: 93 FL (ref 80–100)
ERYTHROCYTES (10*6/UL) IN BLOOD BY AUTOMATED COUNT: 3.76 X10E12/L (ref 4–5.2)
ESTIMATED AVERAGE GLUCOSE FOR HBA1C: 174 MG/DL
HEMATOCRIT (%) IN BLOOD BY AUTOMATED COUNT: 35.1 % (ref 36–46)
HEMOGLOBIN (G/DL) IN BLOOD: 11 G/DL (ref 12–16)
HEMOGLOBIN A1C/HEMOGLOBIN TOTAL IN BLOOD: 7.7 %
LEUKOCYTES (10*3/UL) IN BLOOD BY AUTOMATED COUNT: 5.6 X10E9/L (ref 4.4–11.3)
PLATELETS (10*3/UL) IN BLOOD AUTOMATED COUNT: 197 X10E9/L (ref 150–450)
POC INR: 2.4 (ref 0.9–1.1)

## 2023-07-19 PROCEDURE — 3079F DIAST BP 80-89 MM HG: CPT | Performed by: FAMILY MEDICINE

## 2023-07-19 PROCEDURE — 3008F BODY MASS INDEX DOCD: CPT | Performed by: FAMILY MEDICINE

## 2023-07-19 PROCEDURE — 85027 COMPLETE CBC AUTOMATED: CPT

## 2023-07-19 PROCEDURE — 3051F HG A1C>EQUAL 7.0%<8.0%: CPT | Performed by: FAMILY MEDICINE

## 2023-07-19 PROCEDURE — 36415 COLL VENOUS BLD VENIPUNCTURE: CPT

## 2023-07-19 PROCEDURE — 85610 PROTHROMBIN TIME: CPT | Performed by: FAMILY MEDICINE

## 2023-07-19 PROCEDURE — 3077F SYST BP >= 140 MM HG: CPT | Performed by: FAMILY MEDICINE

## 2023-07-19 PROCEDURE — 82607 VITAMIN B-12: CPT

## 2023-07-19 PROCEDURE — 1159F MED LIST DOCD IN RCRD: CPT | Performed by: FAMILY MEDICINE

## 2023-07-19 PROCEDURE — 1036F TOBACCO NON-USER: CPT | Performed by: FAMILY MEDICINE

## 2023-07-19 PROCEDURE — 83036 HEMOGLOBIN GLYCOSYLATED A1C: CPT

## 2023-07-19 PROCEDURE — 82306 VITAMIN D 25 HYDROXY: CPT

## 2023-07-19 PROCEDURE — 99214 OFFICE O/P EST MOD 30 MIN: CPT | Performed by: FAMILY MEDICINE

## 2023-07-19 NOTE — PROGRESS NOTES
"Subjective   Patient ID: Bree Elam is a 67 y.o. female who presents for Atrial Fibrillation (INR- 2.4).    HPI   Patient comes in today for checkup and monitoring of her medication. She has no voiced complaints and no evidence of bleeding or other problems from the Coumadin.  She is complaining of pain in her right knee.  She states it has been going on for a few days.  Last month when she was here she stated that she had a fall but denies any recent trauma or injury to her knee today.    Her son who lives with her, has found a job since last visit.  He however apparently is not treating his mother with respect and expects her to be his \"maid\".  She believes he has DM and is super morbidly obese and needs to be seen by a doctor but he refuses to go.  She worries about him constantly.    Patient also is complaining today of short-term memory loss.  She states that it has been going on for a while and wonders what can be done about it?  We had a lengthy discussion about it today.    6/19/2023  Patient comes in today for checkup and monitoring of her medication. She has no voiced complaints and no evidence of bleeding or other problems from the Coumadin.  She did fall on 6/9/2023 but fortunately did not hurt anything.  Her INR was good today at 2.3.    Patient is having some stress at home as her son is living with her.  He is unemployed, morbidly obese and apparently he is not motivated to do anything.      Review of Systems   All other systems reviewed and are negative.      Objective   /83   Pulse 60   Temp 36.4 °C (97.6 °F)   Resp 20   Wt 150 kg (330 lb)   LMP  (LMP Unknown)   SpO2 94%   BMI 58.46 kg/m²     Physical Exam  Vitals reviewed.   Constitutional:       Appearance: She is morbidly obese.      Comments: Super morbidly obese 67-year-old  female   HENT:      Head: Normocephalic and atraumatic.      Right Ear: Tympanic membrane, ear canal and external ear normal.      Left Ear: " Tympanic membrane, ear canal and external ear normal.      Nose: Nose normal.      Mouth/Throat:      Mouth: Mucous membranes are moist.      Pharynx: Oropharynx is clear.   Eyes:      Extraocular Movements: Extraocular movements intact.      Conjunctiva/sclera: Conjunctivae normal.      Pupils: Pupils are equal, round, and reactive to light.   Cardiovascular:      Rate and Rhythm: Normal rate and regular rhythm.      Heart sounds: Normal heart sounds. No murmur heard.  Pulmonary:      Effort: Pulmonary effort is normal.      Breath sounds: Normal breath sounds. No wheezing.   Abdominal:      General: Abdomen is flat. Bowel sounds are normal.      Palpations: Abdomen is soft.   Musculoskeletal:         General: Normal range of motion.   Skin:     General: Skin is warm and dry.   Neurological:      General: No focal deficit present.      Mental Status: She is alert and oriented to person, place, and time. Mental status is at baseline.   Psychiatric:         Mood and Affect: Mood normal.         Behavior: Behavior normal.         Thought Content: Thought content normal.         Judgment: Judgment normal.         Assessment/Plan   Problem List Items Addressed This Visit       Atrial fibrillation (CMS/[facility])    B12 deficiency    Relevant Orders    Vitamin B12    CBC    Diabetes mellitus, type 2 (CMS/HCC) - Primary    Relevant Orders    Hemoglobin A1C    Knee pain, right    Vitamin D deficiency    Relevant Orders    Vitamin D, Total    Morbid obesity with BMI of 50.0-59.9, adult (CMS/HCC)    Anemia    Relevant Orders    CBC    Short-term memory loss    NYDIA on CPAP   Will contact patient with lab results when available.  Will contact patient with x-ray results when they are available.  Medication list reconciled.  Thank you for visiting today!      Professional services: Some of this note was completed using Dragon voice technology and sometimes the software misinterprets words. This may include unintended errors with  respect to translation of words, typographical errors or grammar errors which may not have been identified prior to finalization of the chart note. Please take this into account when reading the note. Thank you.

## 2023-07-24 ENCOUNTER — TELEPHONE (OUTPATIENT)
Dept: PRIMARY CARE | Facility: CLINIC | Age: 68
End: 2023-07-24
Payer: MEDICARE

## 2023-07-24 DIAGNOSIS — G47.33 OSA ON CPAP: Primary | ICD-10-CM

## 2023-07-24 NOTE — TELEPHONE ENCOUNTER
Patient would like to go over the results of her lab work and x-ray with the doctor.  Patient can be reached at [phone].    Thank You

## 2023-08-21 ENCOUNTER — OFFICE VISIT (OUTPATIENT)
Dept: PRIMARY CARE | Facility: CLINIC | Age: 68
End: 2023-08-21
Payer: MEDICARE

## 2023-08-21 VITALS
DIASTOLIC BLOOD PRESSURE: 75 MMHG | RESPIRATION RATE: 16 BRPM | SYSTOLIC BLOOD PRESSURE: 153 MMHG | BODY MASS INDEX: 57.93 KG/M2 | OXYGEN SATURATION: 94 % | TEMPERATURE: 97.7 F | WEIGHT: 293 LBS | HEART RATE: 60 BPM

## 2023-08-21 DIAGNOSIS — E66.01 MORBID OBESITY WITH BMI OF 50.0-59.9, ADULT (MULTI): ICD-10-CM

## 2023-08-21 DIAGNOSIS — I48.11 LONGSTANDING PERSISTENT ATRIAL FIBRILLATION (MULTI): Primary | ICD-10-CM

## 2023-08-21 DIAGNOSIS — J44.9 CHRONIC OBSTRUCTIVE PULMONARY DISEASE, UNSPECIFIED COPD TYPE (MULTI): ICD-10-CM

## 2023-08-21 DIAGNOSIS — G47.33 OSA ON CPAP: ICD-10-CM

## 2023-08-21 DIAGNOSIS — E11.22 CKD STAGE 3 SECONDARY TO DIABETES (MULTI): ICD-10-CM

## 2023-08-21 DIAGNOSIS — I10 HYPERTENSION, UNSPECIFIED TYPE: ICD-10-CM

## 2023-08-21 DIAGNOSIS — N18.30 CKD STAGE 3 SECONDARY TO DIABETES (MULTI): ICD-10-CM

## 2023-08-21 LAB — POC INR: 1.6 (ref 0.9–1.1)

## 2023-08-21 PROCEDURE — 85610 PROTHROMBIN TIME: CPT | Performed by: FAMILY MEDICINE

## 2023-08-21 PROCEDURE — 99214 OFFICE O/P EST MOD 30 MIN: CPT | Performed by: FAMILY MEDICINE

## 2023-08-21 NOTE — PROGRESS NOTES
"Subjective   Patient ID: Bree Elam is a 67 y.o. female who presents for Follow-up (1mo fu inr- 1.6).    HPI   Patient comes in today for checkup and refill of medications.  She is currently without complaints.  Her INR is low but she is unsure why other than she has been eating more salads lately.    Patient needs her CPAP settings sent to the [employer] service company so she can get her CPAP supplies. She uses the CPAP every night and can tell when she does not use it.    7/19/2023  Patient comes in today for checkup and monitoring of her medication. She has no voiced complaints and no evidence of bleeding or other problems from the Coumadin.  She is complaining of pain in her right knee.  She states it has been going on for a few days.  Last month when she was here she stated that she had a fall but denies any recent trauma or injury to her knee today.    Her son who lives with her, has found a job since last visit.  He however apparently is not treating his mother with respect and expects her to be his \"maid\".  She believes he has DM and is super morbidly obese and needs to be seen by a doctor but he refuses to go.  She worries about him constantly.    Patient also is complaining today of short-term memory loss.  She states that it has been going on for a while and wonders what can be done about it?  We had a lengthy discussion about it today.    Review of Systems   All other systems reviewed and are negative.      Objective   /75   Pulse 60   Temp 36.5 °C (97.7 °F)   Resp 16   Wt 148 kg (327 lb)   LMP  (LMP Unknown)   SpO2 94%   BMI 57.93 kg/m²     Physical Exam  Vitals reviewed.   Constitutional:       Appearance: She is morbidly obese.      Comments: Morbidly obese 67-year-old  female   HENT:      Head: Normocephalic and atraumatic.      Right Ear: Tympanic membrane, ear canal and external ear normal.      Left Ear: Tympanic membrane, ear canal and external ear normal.      " Nose: Nose normal.      Mouth/Throat:      Mouth: Mucous membranes are moist.      Pharynx: Oropharynx is clear.   Eyes:      Extraocular Movements: Extraocular movements intact.      Conjunctiva/sclera: Conjunctivae normal.      Pupils: Pupils are equal, round, and reactive to light.   Cardiovascular:      Rate and Rhythm: Normal rate and regular rhythm.      Heart sounds: Normal heart sounds. No murmur heard.  Pulmonary:      Effort: Pulmonary effort is normal.      Breath sounds: Normal breath sounds. No wheezing.   Abdominal:      General: Abdomen is flat. Bowel sounds are normal.      Palpations: Abdomen is soft.   Musculoskeletal:         General: Normal range of motion.   Skin:     General: Skin is warm and dry.   Neurological:      General: No focal deficit present.      Mental Status: She is alert and oriented to person, place, and time. Mental status is at baseline.   Psychiatric:         Mood and Affect: Mood normal.         Behavior: Behavior normal.         Thought Content: Thought content normal.         Judgment: Judgment normal.         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  RTC in one month for recheck, sooner should problems arise.  CPAP settings faxed to [employer].  Medication list reconciled.  Thank you for visiting today!      Professional services: Some of this note was completed using Dragon voice technology and sometimes the software misinterprets words. This may include unintended errors with respect to translation of words, typographical errors or grammar errors which may not have been identified prior to finalization of the  chart note. Please take this into account when reading the note. Thank you.

## 2023-08-29 DIAGNOSIS — I10 HYPERTENSION, UNSPECIFIED TYPE: ICD-10-CM

## 2023-08-29 RX ORDER — SOTALOL HYDROCHLORIDE 120 MG/1
120 TABLET ORAL 2 TIMES DAILY
Qty: 180 TABLET | Refills: 1 | Status: SHIPPED | OUTPATIENT
Start: 2023-08-29 | End: 2024-02-27

## 2023-08-29 NOTE — TELEPHONE ENCOUNTER
Requested Prescriptions     Pending Prescriptions Disp Refills    sotalol (Betapace) 120 mg tablet [Pharmacy Med Name: Sotalol HCl 120 MG Oral Tablet] 180 tablet 1     Sig: Take 1 tablet (120 mg) by mouth 2 times a day.

## 2023-09-14 DIAGNOSIS — G47.33 OSA ON CPAP: Primary | ICD-10-CM

## 2023-09-21 ENCOUNTER — OFFICE VISIT (OUTPATIENT)
Dept: PRIMARY CARE | Facility: CLINIC | Age: 68
End: 2023-09-21
Payer: MEDICARE

## 2023-09-21 VITALS
HEART RATE: 59 BPM | OXYGEN SATURATION: 94 % | RESPIRATION RATE: 16 BRPM | BODY MASS INDEX: 57.39 KG/M2 | SYSTOLIC BLOOD PRESSURE: 157 MMHG | TEMPERATURE: 97 F | WEIGHT: 293 LBS | DIASTOLIC BLOOD PRESSURE: 77 MMHG

## 2023-09-21 DIAGNOSIS — Z23 ENCOUNTER FOR IMMUNIZATION: ICD-10-CM

## 2023-09-21 DIAGNOSIS — I48.11 LONGSTANDING PERSISTENT ATRIAL FIBRILLATION (MULTI): Primary | ICD-10-CM

## 2023-09-21 DIAGNOSIS — R06.09 DOE (DYSPNEA ON EXERTION): ICD-10-CM

## 2023-09-21 DIAGNOSIS — E66.01 MORBID OBESITY WITH BMI OF 50.0-59.9, ADULT (MULTI): ICD-10-CM

## 2023-09-21 LAB — POC INR: 2.1 (ref 0.9–1.1)

## 2023-09-21 PROCEDURE — 90677 PCV20 VACCINE IM: CPT | Performed by: FAMILY MEDICINE

## 2023-09-21 PROCEDURE — 85610 PROTHROMBIN TIME: CPT | Performed by: FAMILY MEDICINE

## 2023-09-21 PROCEDURE — G0009 ADMIN PNEUMOCOCCAL VACCINE: HCPCS | Performed by: FAMILY MEDICINE

## 2023-09-21 PROCEDURE — 90662 IIV NO PRSV INCREASED AG IM: CPT | Performed by: FAMILY MEDICINE

## 2023-09-21 PROCEDURE — G0008 ADMIN INFLUENZA VIRUS VAC: HCPCS | Performed by: FAMILY MEDICINE

## 2023-09-21 PROCEDURE — 99214 OFFICE O/P EST MOD 30 MIN: CPT | Performed by: FAMILY MEDICINE

## 2023-09-21 NOTE — PROGRESS NOTES
Subjective   Patient ID: Bree Elam is a 67 y.o. female who presents for Follow-up.    HPI   Patient comes in today for checkup and monitoring of her medication. She has no evidence of bleeding or other problems from the Coumadin.  Her INR today is 2.1.  She is complaining of being forgetful at times and feeling exhausted.  She is taking care of her own dog and now her son's new puppy as well.  She feels like she is getting exhausted after walking short distances.  She does see Dr. Pantoja, cardiology on a regular basis and did have a high CACS of 1154.15 in April 2021.  Her last echo was 3/20/2022 with LVEF of 55 to 60%.    8/21/2023  Patient comes in today for checkup and refill of medications.  She is currently without complaints.  Her INR is low but she is unsure why other than she has been eating more salads lately.    Patient needs her CPAP settings sent to the [employer] service company so she can get her CPAP supplies. She uses the CPAP every night and can tell when she does not use it.    Review of Systems   All other systems reviewed and are negative.      Objective   /77   Pulse 59   Temp 36.1 °C (97 °F)   Resp 16   Wt 147 kg (324 lb)   LMP  (LMP Unknown)   SpO2 94%   BMI 57.39 kg/m²     Physical Exam  Vitals reviewed.   Constitutional:       Appearance: She is well-developed.   HENT:      Head: Normocephalic and atraumatic.      Right Ear: Tympanic membrane, ear canal and external ear normal.      Left Ear: Tympanic membrane, ear canal and external ear normal.      Nose: Nose normal.      Mouth/Throat:      Mouth: Mucous membranes are moist.      Pharynx: Oropharynx is clear.   Eyes:      Extraocular Movements: Extraocular movements intact.      Conjunctiva/sclera: Conjunctivae normal.      Pupils: Pupils are equal, round, and reactive to light.   Cardiovascular:      Rate and Rhythm: Normal rate and regular rhythm.      Heart sounds: Normal heart sounds. No murmur  heard.  Pulmonary:      Effort: Pulmonary effort is normal.      Breath sounds: Normal breath sounds. No wheezing.   Abdominal:      General: Abdomen is flat. Bowel sounds are normal.      Palpations: Abdomen is soft.   Musculoskeletal:         General: Normal range of motion.   Skin:     General: Skin is warm and dry.   Neurological:      General: No focal deficit present.      Mental Status: She is alert and oriented to person, place, and time. Mental status is at baseline.   Psychiatric:         Mood and Affect: Mood normal.         Behavior: Behavior normal.         Thought Content: Thought content normal.         Judgment: Judgment normal.         Assessment/Plan   Problem List Items Addressed This Visit       Atrial fibrillation (CMS/HCC) - Primary    Relevant Orders    POCT INR manually resulted (Completed)    Morbid obesity with BMI of 50.0-59.9, adult (CMS/HCC)    MURRAY (dyspnea on exertion)    Encounter for immunization    Relevant Orders    Flu vaccine, quadrivalent, high-dose, preservative free, age 65y+ (FLUZONE) (Completed)    Pneumococcal conjugate vaccine, 20-valent, adult (PREVNAR 20) (Completed)   Immunization updates today.  Medication list reconciled.  Thank you for visiting today!      Professional services: Some of this note was completed using Dragon voice technology and sometimes the software misinterprets words. This may include unintended errors with respect to translation of words, typographical errors or grammar errors which may not have been identified prior to finalization of the chart note. Please take this into account when reading the note. Thank you.

## 2023-10-19 ENCOUNTER — APPOINTMENT (OUTPATIENT)
Dept: PRIMARY CARE | Facility: CLINIC | Age: 68
End: 2023-10-19
Payer: MEDICARE

## 2023-11-11 DIAGNOSIS — E11.9 TYPE 2 DIABETES MELLITUS WITHOUT COMPLICATION, WITHOUT LONG-TERM CURRENT USE OF INSULIN (MULTI): ICD-10-CM

## 2023-11-11 DIAGNOSIS — I10 HYPERTENSION, UNSPECIFIED TYPE: ICD-10-CM

## 2023-11-14 RX ORDER — PIOGLITAZONEHYDROCHLORIDE 45 MG/1
TABLET ORAL
Qty: 90 TABLET | Refills: 0 | Status: SHIPPED | OUTPATIENT
Start: 2023-11-14 | End: 2024-02-12

## 2023-11-14 RX ORDER — AMLODIPINE BESYLATE 10 MG/1
10 TABLET ORAL DAILY
Qty: 90 TABLET | Refills: 0 | Status: SHIPPED | OUTPATIENT
Start: 2023-11-14 | End: 2024-02-10

## 2023-11-15 DIAGNOSIS — E11.9 TYPE 2 DIABETES MELLITUS WITHOUT COMPLICATION, WITHOUT LONG-TERM CURRENT USE OF INSULIN (MULTI): ICD-10-CM

## 2023-11-17 RX ORDER — METFORMIN HYDROCHLORIDE 500 MG/1
TABLET ORAL
Qty: 360 TABLET | Refills: 0 | Status: SHIPPED | OUTPATIENT
Start: 2023-11-17 | End: 2024-01-25

## 2023-11-18 DIAGNOSIS — Z79.01 ANTICOAGULATED ON COUMADIN: Primary | ICD-10-CM

## 2023-11-18 DIAGNOSIS — I48.91 ATRIAL FIBRILLATION, UNSPECIFIED TYPE (MULTI): ICD-10-CM

## 2023-11-19 DIAGNOSIS — E11.9 TYPE 2 DIABETES MELLITUS WITHOUT COMPLICATION, WITHOUT LONG-TERM CURRENT USE OF INSULIN (MULTI): ICD-10-CM

## 2023-11-20 RX ORDER — GLIPIZIDE 5 MG/1
10 TABLET ORAL 2 TIMES DAILY
Qty: 360 TABLET | Refills: 0 | Status: SHIPPED | OUTPATIENT
Start: 2023-11-20 | End: 2024-02-26 | Stop reason: SDUPTHER

## 2023-11-20 RX ORDER — WARFARIN 3 MG/1
3 TABLET ORAL EVERY EVENING
Qty: 90 TABLET | Refills: 0 | Status: SHIPPED | OUTPATIENT
Start: 2023-11-20 | End: 2024-02-26 | Stop reason: SDUPTHER

## 2023-11-20 NOTE — TELEPHONE ENCOUNTER
Pharmacy requests prescription below    Last Office Visit: 9/21/2023     Requested Prescriptions     Pending Prescriptions Disp Refills    glipiZIDE (Glucotrol) 5 mg tablet [Pharmacy Med Name: glipiZIDE 5 MG Oral Tablet] 360 tablet 0     Sig: Take 2 tablets by mouth twice daily

## 2023-11-20 NOTE — TELEPHONE ENCOUNTER
Pt has not had INR checked in 2 mos (usually comes in every month to have checked). Please review and advise.     Requested Prescriptions     Pending Prescriptions Disp Refills    warfarin (Coumadin) 3 mg tablet [Pharmacy Med Name: Warfarin Sodium 3 MG Oral Tablet] 90 tablet 0     Sig: TAKE 1 TABLET BY MOUTH ONCE DAILY IN THE EVENING

## 2023-11-21 NOTE — TELEPHONE ENCOUNTER
Spoke to the patient and let her know that the order was in the chart and that she needs to get this done.  Patient states that she will have it done first thing in the morning.

## 2023-11-22 ENCOUNTER — LAB (OUTPATIENT)
Dept: LAB | Facility: LAB | Age: 68
End: 2023-11-22
Payer: MEDICARE

## 2023-11-22 DIAGNOSIS — Z79.01 ANTICOAGULATED ON COUMADIN: ICD-10-CM

## 2023-11-22 LAB
INR PPP: 2 (ref 0.9–1.1)
PROTHROMBIN TIME: 22.7 SECONDS (ref 9.8–12.8)

## 2023-11-22 PROCEDURE — 36415 COLL VENOUS BLD VENIPUNCTURE: CPT

## 2023-11-22 PROCEDURE — 85610 PROTHROMBIN TIME: CPT

## 2023-12-06 DIAGNOSIS — F32.A DEPRESSION, UNSPECIFIED DEPRESSION TYPE: ICD-10-CM

## 2023-12-06 DIAGNOSIS — E78.5 HYPERLIPIDEMIA, UNSPECIFIED HYPERLIPIDEMIA TYPE: ICD-10-CM

## 2023-12-13 RX ORDER — ROSUVASTATIN CALCIUM 20 MG/1
20 TABLET, COATED ORAL EVERY EVENING
Qty: 90 TABLET | Refills: 1 | Status: SHIPPED | OUTPATIENT
Start: 2023-12-13 | End: 2024-06-05

## 2023-12-13 RX ORDER — SERTRALINE HYDROCHLORIDE 100 MG/1
200 TABLET, FILM COATED ORAL NIGHTLY
Qty: 180 TABLET | Refills: 1 | Status: SHIPPED | OUTPATIENT
Start: 2023-12-13 | End: 2024-06-05

## 2024-01-24 ENCOUNTER — OFFICE VISIT (OUTPATIENT)
Dept: PRIMARY CARE | Facility: CLINIC | Age: 69
End: 2024-01-24
Payer: MEDICARE

## 2024-01-24 VITALS
TEMPERATURE: 97.3 F | WEIGHT: 293 LBS | HEART RATE: 60 BPM | DIASTOLIC BLOOD PRESSURE: 81 MMHG | BODY MASS INDEX: 57.93 KG/M2 | RESPIRATION RATE: 20 BRPM | SYSTOLIC BLOOD PRESSURE: 140 MMHG | OXYGEN SATURATION: 93 %

## 2024-01-24 DIAGNOSIS — E66.01 MORBID OBESITY WITH BMI OF 50.0-59.9, ADULT (MULTI): ICD-10-CM

## 2024-01-24 DIAGNOSIS — E11.22 CKD STAGE 3 SECONDARY TO DIABETES (MULTI): ICD-10-CM

## 2024-01-24 DIAGNOSIS — J44.9 CHRONIC OBSTRUCTIVE PULMONARY DISEASE, UNSPECIFIED COPD TYPE (MULTI): ICD-10-CM

## 2024-01-24 DIAGNOSIS — N18.30 CKD STAGE 3 SECONDARY TO DIABETES (MULTI): ICD-10-CM

## 2024-01-24 DIAGNOSIS — G47.33 OSA ON CPAP: ICD-10-CM

## 2024-01-24 DIAGNOSIS — E11.3313 MODERATE NONPROLIFERATIVE DIABETIC RETINOPATHY OF BOTH EYES WITH MACULAR EDEMA ASSOCIATED WITH TYPE 2 DIABETES MELLITUS (MULTI): Primary | ICD-10-CM

## 2024-01-24 DIAGNOSIS — I48.11 LONGSTANDING PERSISTENT ATRIAL FIBRILLATION (MULTI): ICD-10-CM

## 2024-01-24 LAB — POC INR: 1.9 (ref 0.9–1.1)

## 2024-01-24 PROCEDURE — 85610 PROTHROMBIN TIME: CPT | Performed by: FAMILY MEDICINE

## 2024-01-24 PROCEDURE — 99214 OFFICE O/P EST MOD 30 MIN: CPT | Performed by: FAMILY MEDICINE

## 2024-01-24 NOTE — PROGRESS NOTES
Subjective   Patient ID: Bree Elam is a 68 y.o. female who presents for Follow-up (Meds and inr-1.9) and Needs Pap Supplies/new Pap Machine (Says [employer] has been tryng to reach out to office to get pt new CPAP supplies. ).    HPI   Patient comes in today for checkup and monitoring of her medication. She has no voiced complaints and no evidence of bleeding or other problems from the Coumadin.  Her INR today was 1.9.  She claims she is taking her medicines as directed.    Patient is using her CPAP as directed and it is working well for her.    9/21/2023  Patient comes in today for checkup and monitoring of her medication. She has no evidence of bleeding or other problems from the Coumadin.  Her INR today is 2.1.  She is complaining of being forgetful at times and feeling exhausted.  She is taking care of her own dog and now her son's new puppy as well.  She feels like she is getting exhausted after walking short distances.  She does see Dr. Pantoja, cardiology on a regular basis and did have a high CACS of 1154.15 in April 2021.  Her last echo was 3/20/2022 with LVEF of 55 to 60%.    8/21/2023  Patient comes in today for checkup and refill of medications.  She is currently without complaints.  Her INR is low but she is unsure why other than she has been eating more salads lately.    Patient needs her CPAP settings sent to the [employer] so she can get her CPAP supplies. She uses the CPAP every night and can tell when she does not use it.    Review of Systems   All other systems reviewed and are negative.      Objective   /81   Pulse 60   Temp 36.3 °C (97.3 °F)   Resp 20   Wt 148 kg (327 lb)   LMP  (LMP Unknown)   SpO2 93%   BMI 57.93 kg/m²     Physical Exam  Vitals reviewed.   Constitutional:       Appearance: She is well-developed. She is morbidly obese.   HENT:      Head: Normocephalic and atraumatic.      Right Ear: Tympanic membrane, ear canal and external  ear normal.      Left Ear: Tympanic membrane, ear canal and external ear normal.      Nose: Nose normal.      Mouth/Throat:      Mouth: Mucous membranes are moist.      Pharynx: Oropharynx is clear.   Eyes:      Extraocular Movements: Extraocular movements intact.      Conjunctiva/sclera: Conjunctivae normal.      Pupils: Pupils are equal, round, and reactive to light.   Cardiovascular:      Rate and Rhythm: Normal rate and regular rhythm.      Heart sounds: Normal heart sounds. No murmur heard.  Pulmonary:      Effort: Pulmonary effort is normal.      Breath sounds: Normal breath sounds. No wheezing.   Abdominal:      General: Abdomen is flat. Bowel sounds are normal.      Palpations: Abdomen is soft.   Musculoskeletal:         General: Normal range of motion.   Skin:     General: Skin is warm and dry.   Neurological:      General: No focal deficit present.      Mental Status: She is alert and oriented to person, place, and time. Mental status is at baseline.   Psychiatric:         Mood and Affect: Mood normal.         Behavior: Behavior normal.         Thought Content: Thought content normal.         Judgment: Judgment normal.         Assessment/Plan   Problem List Items Addressed This Visit             ICD-10-CM    Atrial fibrillation (CMS/[facility]) I48.91    Relevant Orders    POCT INR manually resulted (Completed)    CKD stage 3 secondary to diabetes (CMS/[facility]) E11.22, N18.30    COPD (chronic obstructive pulmonary disease) (CMS/[facility]) J44.9    Morbid obesity with BMI of 50.0-59.9, adult (CMS/[facility]) E66.01, Z68.43    NYDIA on CPAP G47.33    Relevant Orders    Positive Airway Pressure (PAP) Therapy    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  Continue current Coumadin meds.  RTC in one month for recheck, sooner should problems arise.  Medication list reconciled.  Thank you for visiting  today!      Professional services: Some of this note was completed using Dragon voice technology and sometimes the software misinterprets words. This may include unintended errors with respect to translation of words, typographical errors or grammar errors which may not have been identified prior to finalization of the chart note. Please take this into account when reading the note. Thank you.

## 2024-01-25 DIAGNOSIS — E11.9 TYPE 2 DIABETES MELLITUS WITHOUT COMPLICATION, WITHOUT LONG-TERM CURRENT USE OF INSULIN (MULTI): ICD-10-CM

## 2024-01-25 RX ORDER — METFORMIN HYDROCHLORIDE 500 MG/1
1000 TABLET ORAL
Qty: 360 TABLET | Refills: 1 | Status: SHIPPED | OUTPATIENT
Start: 2024-01-25

## 2024-01-25 NOTE — TELEPHONE ENCOUNTER
Requested Prescriptions     Pending Prescriptions Disp Refills    metFORMIN (Glucophage) 500 mg tablet [Pharmacy Med Name: metFORMIN HCl 500 MG Oral Tablet] 360 tablet 0     Sig: TAKE 2 TABLETS BY MOUTH TWICE DAILY WITH MORNING MEAL AND WITH EVENING MEAL

## 2024-02-09 DIAGNOSIS — I10 HYPERTENSION, UNSPECIFIED TYPE: ICD-10-CM

## 2024-02-10 DIAGNOSIS — E11.9 TYPE 2 DIABETES MELLITUS WITHOUT COMPLICATION, WITHOUT LONG-TERM CURRENT USE OF INSULIN (MULTI): ICD-10-CM

## 2024-02-10 RX ORDER — AMLODIPINE BESYLATE 10 MG/1
10 TABLET ORAL DAILY
Qty: 90 TABLET | Refills: 1 | Status: SHIPPED | OUTPATIENT
Start: 2024-02-10

## 2024-02-12 RX ORDER — PIOGLITAZONEHYDROCHLORIDE 45 MG/1
TABLET ORAL
Qty: 90 TABLET | Refills: 0 | Status: SHIPPED | OUTPATIENT
Start: 2024-02-12 | End: 2024-03-20 | Stop reason: ALTCHOICE

## 2024-02-12 NOTE — TELEPHONE ENCOUNTER
Pharmacy requests prescription below    Last Office Visit: 1/24/2024     Requested Prescriptions     Pending Prescriptions Disp Refills    pioglitazone (Actos) 45 mg tablet [Pharmacy Med Name: Pioglitazone HCl 45 MG Oral Tablet] 90 tablet 0     Sig: Take 1 tablet by mouth once daily

## 2024-02-24 DIAGNOSIS — I48.91 ATRIAL FIBRILLATION, UNSPECIFIED TYPE (MULTI): ICD-10-CM

## 2024-02-24 DIAGNOSIS — E11.9 TYPE 2 DIABETES MELLITUS WITHOUT COMPLICATION, WITHOUT LONG-TERM CURRENT USE OF INSULIN (MULTI): ICD-10-CM

## 2024-02-24 DIAGNOSIS — I10 HYPERTENSION, UNSPECIFIED TYPE: ICD-10-CM

## 2024-02-26 ENCOUNTER — LAB (OUTPATIENT)
Dept: LAB | Facility: LAB | Age: 69
End: 2024-02-26
Payer: MEDICARE

## 2024-02-26 ENCOUNTER — OFFICE VISIT (OUTPATIENT)
Dept: PRIMARY CARE | Facility: CLINIC | Age: 69
End: 2024-02-26
Payer: MEDICARE

## 2024-02-26 VITALS
SYSTOLIC BLOOD PRESSURE: 153 MMHG | TEMPERATURE: 97.7 F | HEART RATE: 60 BPM | WEIGHT: 293 LBS | BODY MASS INDEX: 58.28 KG/M2 | DIASTOLIC BLOOD PRESSURE: 69 MMHG | RESPIRATION RATE: 24 BRPM

## 2024-02-26 DIAGNOSIS — E66.01 MORBID OBESITY WITH BMI OF 50.0-59.9, ADULT (MULTI): ICD-10-CM

## 2024-02-26 DIAGNOSIS — E11.9 TYPE 2 DIABETES MELLITUS WITHOUT COMPLICATION, WITHOUT LONG-TERM CURRENT USE OF INSULIN (MULTI): ICD-10-CM

## 2024-02-26 DIAGNOSIS — Z00.00 ROUTINE GENERAL MEDICAL EXAMINATION AT HEALTH CARE FACILITY: Primary | ICD-10-CM

## 2024-02-26 DIAGNOSIS — E55.9 VITAMIN D DEFICIENCY: ICD-10-CM

## 2024-02-26 DIAGNOSIS — I48.91 ATRIAL FIBRILLATION, UNSPECIFIED TYPE (MULTI): ICD-10-CM

## 2024-02-26 DIAGNOSIS — E78.5 HYPERLIPIDEMIA, UNSPECIFIED HYPERLIPIDEMIA TYPE: ICD-10-CM

## 2024-02-26 DIAGNOSIS — Z00.00 ROUTINE GENERAL MEDICAL EXAMINATION AT A HEALTH CARE FACILITY: ICD-10-CM

## 2024-02-26 DIAGNOSIS — M79.645 PAIN OF LEFT THUMB: ICD-10-CM

## 2024-02-26 DIAGNOSIS — E53.8 B12 DEFICIENCY: ICD-10-CM

## 2024-02-26 DIAGNOSIS — Z12.31 ENCOUNTER FOR SCREENING MAMMOGRAM FOR BREAST CANCER: ICD-10-CM

## 2024-02-26 LAB
25(OH)D3 SERPL-MCNC: 60 NG/ML (ref 30–100)
ALBUMIN SERPL BCP-MCNC: 4.2 G/DL (ref 3.4–5)
ALP SERPL-CCNC: 65 U/L (ref 33–136)
ALT SERPL W P-5'-P-CCNC: 16 U/L (ref 7–45)
ANION GAP SERPL CALC-SCNC: 13 MMOL/L (ref 10–20)
AST SERPL W P-5'-P-CCNC: 19 U/L (ref 9–39)
BILIRUB SERPL-MCNC: 0.3 MG/DL (ref 0–1.2)
BUN SERPL-MCNC: 21 MG/DL (ref 6–23)
CALCIUM SERPL-MCNC: 9.5 MG/DL (ref 8.6–10.3)
CHLORIDE SERPL-SCNC: 103 MMOL/L (ref 98–107)
CHOLEST SERPL-MCNC: 140 MG/DL (ref 0–199)
CHOLESTEROL/HDL RATIO: 2.9
CO2 SERPL-SCNC: 30 MMOL/L (ref 21–32)
CREAT SERPL-MCNC: 0.96 MG/DL (ref 0.5–1.05)
CREAT UR-MCNC: 82.9 MG/DL (ref 20–320)
EGFRCR SERPLBLD CKD-EPI 2021: 64 ML/MIN/1.73M*2
ERYTHROCYTE [DISTWIDTH] IN BLOOD BY AUTOMATED COUNT: 13.6 % (ref 11.5–14.5)
EST. AVERAGE GLUCOSE BLD GHB EST-MCNC: 166 MG/DL
GLUCOSE SERPL-MCNC: 170 MG/DL (ref 74–99)
HBA1C MFR BLD: 7.4 %
HCT VFR BLD AUTO: 36.2 % (ref 36–46)
HDLC SERPL-MCNC: 48.8 MG/DL
HGB BLD-MCNC: 11.4 G/DL (ref 12–16)
LDLC SERPL CALC-MCNC: 61 MG/DL
MCH RBC QN AUTO: 29.1 PG (ref 26–34)
MCHC RBC AUTO-ENTMCNC: 31.5 G/DL (ref 32–36)
MCV RBC AUTO: 92 FL (ref 80–100)
MICROALBUMIN UR-MCNC: 93.1 MG/L
MICROALBUMIN/CREAT UR: 112.3 UG/MG CREAT
NON HDL CHOLESTEROL: 91 MG/DL (ref 0–149)
NRBC BLD-RTO: 0 /100 WBCS (ref 0–0)
PLATELET # BLD AUTO: 275 X10*3/UL (ref 150–450)
POTASSIUM SERPL-SCNC: 4.3 MMOL/L (ref 3.5–5.3)
PROT SERPL-MCNC: 6.6 G/DL (ref 6.4–8.2)
RBC # BLD AUTO: 3.92 X10*6/UL (ref 4–5.2)
SODIUM SERPL-SCNC: 142 MMOL/L (ref 136–145)
TRIGL SERPL-MCNC: 149 MG/DL (ref 0–149)
VIT B12 SERPL-MCNC: 221 PG/ML (ref 211–911)
VLDL: 30 MG/DL (ref 0–40)
WBC # BLD AUTO: 6.5 X10*3/UL (ref 4.4–11.3)

## 2024-02-26 PROCEDURE — 85027 COMPLETE CBC AUTOMATED: CPT

## 2024-02-26 PROCEDURE — 82570 ASSAY OF URINE CREATININE: CPT

## 2024-02-26 PROCEDURE — 83036 HEMOGLOBIN GLYCOSYLATED A1C: CPT

## 2024-02-26 PROCEDURE — 82043 UR ALBUMIN QUANTITATIVE: CPT

## 2024-02-26 PROCEDURE — 99214 OFFICE O/P EST MOD 30 MIN: CPT | Performed by: FAMILY MEDICINE

## 2024-02-26 PROCEDURE — 82306 VITAMIN D 25 HYDROXY: CPT

## 2024-02-26 PROCEDURE — 80053 COMPREHEN METABOLIC PANEL: CPT

## 2024-02-26 PROCEDURE — 36415 COLL VENOUS BLD VENIPUNCTURE: CPT

## 2024-02-26 PROCEDURE — 80061 LIPID PANEL: CPT

## 2024-02-26 PROCEDURE — 82607 VITAMIN B-12: CPT

## 2024-02-26 PROCEDURE — G0439 PPPS, SUBSEQ VISIT: HCPCS | Performed by: FAMILY MEDICINE

## 2024-02-26 RX ORDER — WARFARIN 3 MG/1
3 TABLET ORAL EVERY EVENING
Qty: 90 TABLET | Refills: 1 | Status: SHIPPED | OUTPATIENT
Start: 2024-02-26

## 2024-02-26 RX ORDER — GLIPIZIDE 5 MG/1
10 TABLET ORAL 2 TIMES DAILY
Qty: 360 TABLET | Refills: 0 | Status: SHIPPED | OUTPATIENT
Start: 2024-02-26 | End: 2024-04-26 | Stop reason: DRUGHIGH

## 2024-02-26 ASSESSMENT — PATIENT HEALTH QUESTIONNAIRE - PHQ9
1. LITTLE INTEREST OR PLEASURE IN DOING THINGS: SEVERAL DAYS
2. FEELING DOWN, DEPRESSED OR HOPELESS: SEVERAL DAYS
SUM OF ALL RESPONSES TO PHQ9 QUESTIONS 1 AND 2: 2
10. IF YOU CHECKED OFF ANY PROBLEMS, HOW DIFFICULT HAVE THESE PROBLEMS MADE IT FOR YOU TO DO YOUR WORK, TAKE CARE OF THINGS AT HOME, OR GET ALONG WITH OTHER PEOPLE: SOMEWHAT DIFFICULT

## 2024-02-26 ASSESSMENT — ACTIVITIES OF DAILY LIVING (ADL)
BATHING: INDEPENDENT
DOING_HOUSEWORK: INDEPENDENT
MANAGING_FINANCES: INDEPENDENT
GROCERY_SHOPPING: INDEPENDENT
DRESSING: INDEPENDENT
TAKING_MEDICATION: INDEPENDENT

## 2024-02-26 ASSESSMENT — ENCOUNTER SYMPTOMS
DEPRESSION: 1
LOSS OF SENSATION IN FEET: 0
OCCASIONAL FEELINGS OF UNSTEADINESS: 1

## 2024-02-26 NOTE — PROGRESS NOTES
"Subjective   Reason for Visit: Bree Elam is an 68 y.o. female here for a Medicare Wellness visit.   Reviewed all medications by prescribing practitioner or clinical pharmacist (such as prescriptions, OTCs, herbal therapies and supplements) and documented in the medical record.    HPI  Patient comes in today with her daughter Nelly for checkup and monitoring of her medication. She has no voiced complaints and no evidence of bleeding or other problems from the Coumadin.  Patient is also here today for her Medicare wellness exam.  Her INR today is 1.9.    She has several issues today that she would like to discuss.  First of all her left thumb is hurting at the base.  She denies any injury to the thumb.    Patient was sick all last week with 104 degree temp initially and now everything is \"breaking up\".  She still is coughing, she still has nasal drainage and she still has an occasional sore throat.    Patient is super morbidly obese with a BMI of 58.28 and she weighs over 300 pounds.  Her daughter who is with her today states she has difficulty getting up off of the commode.  They would like to get a raised toilet seat if possible.    Patient's daughter would also like to address her obesity today.  She would like to see if there is anything that can help her lose weight.    In her chart patient's last colon exam was in 2018 however patient claims that she had 1 more recently but I cannot find any evidence in her chart order in the records at [facility].    Patient answered her PHQ-9 questionnaire today as a 12 with these problems being somewhat difficult    Patient Care Team:  Edis Patel DO as PCP - General  Edis Patel DO as PCP - [employer] PCP     Review of Systems   All other systems reviewed and are negative.    Objective   Vitals:  /69   Pulse 60   Temp 36.5 °C (97.7 °F)   Resp 24   Wt 149 kg (329 lb)   LMP  (LMP Unknown)   BMI 58.28 kg/m²   "     Physical Exam  Vitals reviewed.   Constitutional:       Appearance: She is well-developed. She is morbidly obese.   HENT:      Head: Normocephalic and atraumatic.      Right Ear: Tympanic membrane, ear canal and external ear normal.      Left Ear: Tympanic membrane, ear canal and external ear normal.      Nose: Congestion and rhinorrhea present.      Mouth/Throat:      Mouth: Mucous membranes are moist.      Pharynx: Oropharynx is clear.   Eyes:      Extraocular Movements: Extraocular movements intact.      Conjunctiva/sclera: Conjunctivae normal.      Pupils: Pupils are equal, round, and reactive to light.   Cardiovascular:      Rate and Rhythm: Normal rate. Rhythm irregular.      Heart sounds: Normal heart sounds. No murmur heard.  Pulmonary:      Effort: Pulmonary effort is normal.      Breath sounds: Normal breath sounds. No wheezing.      Comments: Dry hacking cough  Abdominal:      General: Abdomen is flat. Bowel sounds are normal.      Palpations: Abdomen is soft.   Musculoskeletal:         General: Tenderness (Pain at the base of the left thumb.) present. Normal range of motion.   Skin:     General: Skin is warm and dry.   Neurological:      General: No focal deficit present.      Mental Status: She is alert and oriented to person, place, and time. Mental status is at baseline.   Psychiatric:         Mood and Affect: Mood normal.         Behavior: Behavior normal.         Thought Content: Thought content normal.         Judgment: Judgment normal.       Assessment/Plan   Problem List Items Addressed This Visit       Atrial fibrillation (CMS/HCC)    Relevant Medications    warfarin (Coumadin) 3 mg tablet    Other Relevant Orders    Follow Up In [facility] - PCP - Established    B12 deficiency    Relevant Orders    CBC (Completed)    Vitamin B12 (Completed)    Diabetes mellitus, type 2 (CMS/HCC)    Relevant Medications    glipiZIDE (Glucotrol) 5 mg tablet    Other Relevant Orders    Hemoglobin  A1C (Completed)    Albumin , Urine Random (Completed)    Hyperlipidemia    Relevant Orders    Lipid Panel (Completed)    Vitamin D deficiency    Relevant Orders    Vitamin D 25-Hydroxy,Total (for eval of Vitamin D levels) (Completed)    Morbid obesity with BMI of 50.0-59.9, adult (CMS/[facility])    Current Assessment & Plan     Mediterranean diet recommended         Relevant Orders    Elevated toilet seat     Other Visit Diagnoses       Routine general medical examination at health care facility    -  Primary    Encounter for screening mammogram for breast cancer        Relevant Orders    BI mammo bilateral screening tomosynthesis    Routine general medical examination at a health care facility        Relevant Orders    Comprehensive Metabolic Panel (Completed)    Pain of left thumb        Relevant Orders    XR hand left 3+ views        Will contact patient with x-ray results when they are available.  Will contact patient with lab results when available.  Raised toilet seat ordered.  Consider weight loss medication.  Continue current Coumadin meds.  RTC in one month for recheck, sooner should problems arise.  Medication list reconciled.  Thank you for visiting today!      Professional services: Some of this note was completed using Dragon voice technology and sometimes the software misinterprets words. This may include unintended errors with respect to translation of words, typographical errors or grammar errors which may not have been identified prior to finalization of the chart note. Please take this into account when reading the note. Thank you.

## 2024-02-27 RX ORDER — SOTALOL HYDROCHLORIDE 120 MG/1
120 TABLET ORAL 2 TIMES DAILY
Qty: 180 TABLET | Refills: 1 | Status: SHIPPED | OUTPATIENT
Start: 2024-02-27 | End: 2024-08-25

## 2024-02-27 RX ORDER — WARFARIN 3 MG/1
3 TABLET ORAL EVERY EVENING
Qty: 90 TABLET | Refills: 0 | OUTPATIENT
Start: 2024-02-27

## 2024-02-27 RX ORDER — GLIPIZIDE 5 MG/1
10 TABLET ORAL 2 TIMES DAILY
Qty: 360 TABLET | Refills: 0 | OUTPATIENT
Start: 2024-02-27

## 2024-02-29 DIAGNOSIS — I10 HYPERTENSION, UNSPECIFIED TYPE: ICD-10-CM

## 2024-02-29 RX ORDER — HYDROCHLOROTHIAZIDE 25 MG/1
25 TABLET ORAL DAILY
Qty: 90 TABLET | Refills: 1 | Status: SHIPPED | OUTPATIENT
Start: 2024-02-29

## 2024-03-01 ENCOUNTER — TELEPHONE (OUTPATIENT)
Dept: PRIMARY CARE | Facility: CLINIC | Age: 69
End: 2024-03-01
Payer: MEDICARE

## 2024-03-01 DIAGNOSIS — Z79.4 TYPE 2 DIABETES MELLITUS WITHOUT COMPLICATION, WITH LONG-TERM CURRENT USE OF INSULIN (MULTI): Primary | ICD-10-CM

## 2024-03-01 DIAGNOSIS — E11.9 TYPE 2 DIABETES MELLITUS WITHOUT COMPLICATION, WITH LONG-TERM CURRENT USE OF INSULIN (MULTI): Primary | ICD-10-CM

## 2024-03-01 NOTE — TELEPHONE ENCOUNTER
----- Message from Edis Patel DO sent at 2/28/2024  7:14 AM EST -----  Regarding: Labs  Please notify patient of hemoglobin A1c of 7.4.  Would suggest we add Ozempic for better control of the sugar as well as for weight loss.  I will place an order for it and will see if your insurance covers it.    Low normal B12 of 221.  It should be above 400, when less than 400 one may have both neurological and/or psychological symptoms. Would recommend B-12 shots monthly for 4 months and recheck in June 2024.   These can be done each month in the office when you come in for your INR.      All the rest of your labs are good.  Recheck again in a year.  ----- Message -----  From: Lab, Background User  Sent: 2/26/2024  11:53 AM EST  To: Edis Patel DO

## 2024-03-01 NOTE — TELEPHONE ENCOUNTER
Pt was given results and verbalized understanding. No questions at this time.   She is agreeable to trying to ozempic and will let us know if she hears anything from insurance/pharmacy before the office does.

## 2024-03-20 ENCOUNTER — TELEPHONE (OUTPATIENT)
Dept: PRIMARY CARE | Facility: CLINIC | Age: 69
End: 2024-03-20
Payer: MEDICARE

## 2024-03-20 DIAGNOSIS — Z79.4 TYPE 2 DIABETES MELLITUS WITHOUT COMPLICATION, WITH LONG-TERM CURRENT USE OF INSULIN (MULTI): ICD-10-CM

## 2024-03-20 DIAGNOSIS — E11.9 TYPE 2 DIABETES MELLITUS WITHOUT COMPLICATION, WITH LONG-TERM CURRENT USE OF INSULIN (MULTI): ICD-10-CM

## 2024-03-20 NOTE — TELEPHONE ENCOUNTER
Patient is calling she is concerned that her sugar levels are crashing.  Patient states that she started a diet and that her sugar has been in the 60's and she has had to take something for it.  Patient can be reached  at [phone].        Thank You

## 2024-03-20 NOTE — TELEPHONE ENCOUNTER
I spoke with patient this evening and she is working with her daughter using the Mediterranean diet.  She states she is really enjoying it thus far and has lost 15 pounds but she has noticed that her blood sugars have been down in the 60s a couple of times.  She does not yet have the Ozempic but she is taking the metformin, glipizide and Actos.  Will have her stop the Actos for now.  She states she has not heard from the insurance company regarding her Ozempic.  We will need to check into this for her.

## 2024-03-26 ENCOUNTER — OFFICE VISIT (OUTPATIENT)
Dept: PRIMARY CARE | Facility: CLINIC | Age: 69
End: 2024-03-26
Payer: MEDICARE

## 2024-03-26 ENCOUNTER — TELEPHONE (OUTPATIENT)
Dept: PRIMARY CARE | Facility: CLINIC | Age: 69
End: 2024-03-26

## 2024-03-26 VITALS
BODY MASS INDEX: 55.87 KG/M2 | DIASTOLIC BLOOD PRESSURE: 74 MMHG | TEMPERATURE: 97.1 F | RESPIRATION RATE: 20 BRPM | HEART RATE: 60 BPM | OXYGEN SATURATION: 95 % | WEIGHT: 293 LBS | SYSTOLIC BLOOD PRESSURE: 154 MMHG

## 2024-03-26 DIAGNOSIS — E11.3313 MODERATE NONPROLIFERATIVE DIABETIC RETINOPATHY OF BOTH EYES WITH MACULAR EDEMA ASSOCIATED WITH TYPE 2 DIABETES MELLITUS (MULTI): ICD-10-CM

## 2024-03-26 DIAGNOSIS — E11.9 TYPE 2 DIABETES MELLITUS WITHOUT COMPLICATION, WITH LONG-TERM CURRENT USE OF INSULIN (MULTI): Primary | ICD-10-CM

## 2024-03-26 DIAGNOSIS — Z79.4 TYPE 2 DIABETES MELLITUS WITHOUT COMPLICATION, WITH LONG-TERM CURRENT USE OF INSULIN (MULTI): Primary | ICD-10-CM

## 2024-03-26 DIAGNOSIS — E66.01 MORBID OBESITY WITH BMI OF 50.0-59.9, ADULT (MULTI): ICD-10-CM

## 2024-03-26 DIAGNOSIS — F43.9 STRESS AT HOME: ICD-10-CM

## 2024-03-26 DIAGNOSIS — G47.33 OSA ON CPAP: ICD-10-CM

## 2024-03-26 DIAGNOSIS — E53.8 B12 DEFICIENCY: ICD-10-CM

## 2024-03-26 DIAGNOSIS — I48.91 ATRIAL FIBRILLATION, UNSPECIFIED TYPE (MULTI): ICD-10-CM

## 2024-03-26 LAB — POC INR: 2.2 (ref 0.9–1.1)

## 2024-03-26 PROCEDURE — 3078F DIAST BP <80 MM HG: CPT | Performed by: FAMILY MEDICINE

## 2024-03-26 PROCEDURE — 99214 OFFICE O/P EST MOD 30 MIN: CPT | Performed by: FAMILY MEDICINE

## 2024-03-26 PROCEDURE — 96372 THER/PROPH/DIAG INJ SC/IM: CPT | Performed by: FAMILY MEDICINE

## 2024-03-26 PROCEDURE — 3008F BODY MASS INDEX DOCD: CPT | Performed by: FAMILY MEDICINE

## 2024-03-26 PROCEDURE — 1160F RVW MEDS BY RX/DR IN RCRD: CPT | Performed by: FAMILY MEDICINE

## 2024-03-26 PROCEDURE — 3077F SYST BP >= 140 MM HG: CPT | Performed by: FAMILY MEDICINE

## 2024-03-26 PROCEDURE — 3051F HG A1C>EQUAL 7.0%<8.0%: CPT | Performed by: FAMILY MEDICINE

## 2024-03-26 PROCEDURE — 3048F LDL-C <100 MG/DL: CPT | Performed by: FAMILY MEDICINE

## 2024-03-26 PROCEDURE — 3060F POS MICROALBUMINURIA REV: CPT | Performed by: FAMILY MEDICINE

## 2024-03-26 PROCEDURE — 1159F MED LIST DOCD IN RCRD: CPT | Performed by: FAMILY MEDICINE

## 2024-03-26 PROCEDURE — 85610 PROTHROMBIN TIME: CPT | Performed by: FAMILY MEDICINE

## 2024-03-26 RX ORDER — CYANOCOBALAMIN 1000 UG/ML
1000 INJECTION, SOLUTION INTRAMUSCULAR; SUBCUTANEOUS
Status: SHIPPED | OUTPATIENT
Start: 2024-03-26 | End: 2024-07-24

## 2024-03-26 RX ADMIN — CYANOCOBALAMIN 1000 MCG: 1000 INJECTION, SOLUTION INTRAMUSCULAR; SUBCUTANEOUS at 10:09

## 2024-03-26 NOTE — TELEPHONE ENCOUNTER
Patient called in stating that she was approved for the Ozempic.  Patient can be reached at [phone].      Thank You

## 2024-03-26 NOTE — PROGRESS NOTES
Subjective   Patient ID: Bree Elam is a 68 y.o. female who presents for Follow-up (INR-2.2) and B12 Injection (1/4).    HPI  Patient comes in today for checkup and monitoring of her medication. She has no voiced complaints and no evidence of bleeding or other problems from the Coumadin.  Her INR today is 2.2.    Patient also here for B12 follow-up.  She is due for her first B12 shot.    Patient has lost 14 pounds so far since last month by following the Mediterranean diet.  She has just been approved for Ozempic by her insurance.    Patient needs a new CPAP machine.  She uses it faithfully every day.  She has been requesting 1 from the medical service company since last July.  We have faxed numerous requests to them.    Review of Systems   All other systems reviewed and are negative.      Objective   Vitals:  /74   Pulse 60   Temp 36.2 °C (97.1 °F)   Resp 20   Wt 143 kg (315 lb 6.4 oz)   LMP  (LMP Unknown)   SpO2 95%   BMI 55.87 kg/m²     Physical Exam  Vitals reviewed.   Constitutional:       Appearance: She is well-developed. She is morbidly obese.      Comments: Patient is super morbidly obese with BMI at 55.87   HENT:      Head: Normocephalic and atraumatic.      Right Ear: Tympanic membrane, ear canal and external ear normal.      Left Ear: Tympanic membrane, ear canal and external ear normal.      Nose: Nose normal.      Mouth/Throat:      Mouth: Mucous membranes are moist.      Pharynx: Oropharynx is clear.   Eyes:      Extraocular Movements: Extraocular movements intact.      Conjunctiva/sclera: Conjunctivae normal.      Pupils: Pupils are equal, round, and reactive to light.   Cardiovascular:      Rate and Rhythm: Normal rate and regular rhythm.      Heart sounds: Normal heart sounds. No murmur heard.  Pulmonary:      Effort: Pulmonary effort is normal.      Breath sounds: Normal breath sounds. No wheezing.   Abdominal:      General: Abdomen is flat. Bowel sounds are normal.       Palpations: Abdomen is soft.   Musculoskeletal:         General: Normal range of motion.   Skin:     General: Skin is warm and dry.   Neurological:      General: No focal deficit present.      Mental Status: She is alert and oriented to person, place, and time. Mental status is at baseline.   Psychiatric:         Mood and Affect: Mood normal.         Behavior: Behavior normal.         Thought Content: Thought content normal.         Judgment: Judgment normal.       Assessment/Plan   Problem List Items Addressed This Visit       Atrial fibrillation (CMS/[facility])    Overview     Chronic Coumadin 3 mg daily  INR 2.2 today         Relevant Orders    POCT INR manually resulted (Completed)    B12 deficiency    Overview     Starting B12 shots x 4  First 1 today 3/26/2024         Relevant Medications    cyanocobalamin (Vitamin B-12) injection 1,000 mcg    Diabetes mellitus, type 2 (CMS/[facility]) - Primary    Overview     Recent weight loss of 14 pounds having some hypoglycemic episodes.  Decrease glipizide to 7.5 mg twice daily.    Patient approved for Ozempic today due to start 4/1/2024         Morbid obesity with BMI of 50.0-59.9, adult (CMS/[facility])    Overview     Has lost 14 pounds with Mediterranean diet  Approved for Ozempic 4/1/2024         Stress at home    Overview     Patient is morbidly obese son with multiple medical problems lives at home with her and she is frustrated at his lack of medical follow-up.  He is being seen at [facility].         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  Will contact medical service company regarding same.  All of this was done last summer 2023  however patient claims she never did get a new CPAP machine.  Continue current Coumadin meds.  RTC in one month for recheck, sooner should problems arise.  Medication list reconciled.  Thank you for visiting today!      Professional services: Some of this note was completed using Dragon voice technology and sometimes the software misinterprets words. This may include unintended errors with respect to translation of words, typographical errors or grammar errors which may not have been identified prior to finalization of the chart note. Please take this into account when reading the note. Thank you.      Edis Patel DO 03/30/24 1:08 PM

## 2024-03-26 NOTE — TELEPHONE ENCOUNTER
I spoke with patient this afternoon and she received a letter from her insurance company stating that the Ozempic has been approved.  It does not say for how much a month or where she should get it from.  There is a number to call for further questions and I recommended that she contact them.

## 2024-03-29 ENCOUNTER — TELEPHONE (OUTPATIENT)
Dept: PRIMARY CARE | Facility: CLINIC | Age: 69
End: 2024-03-29
Payer: MEDICARE

## 2024-03-29 NOTE — TELEPHONE ENCOUNTER
Please let patient that I tried to reach her at lunchtime and just got her voice mail but I did not leave a message.  I would like her to go ahead and start the medicine with the reduction in the glipizide as we already talked about and she should let us know if her sugars are again running too low.  Otherwise have a great Easter weekend!

## 2024-03-29 NOTE — TELEPHONE ENCOUNTER
Patient called in stating that she would like to speak with the doctor.  Patient stated that she has sent the prescription for her Ozempic and she should receive it the week of April 1st.  Patient states that she was to call and speak with him before starting because changes had to be made to her other medications.  Patient can be reached at [phone].        Thank You

## 2024-04-10 ENCOUNTER — TELEPHONE (OUTPATIENT)
Dept: PRIMARY CARE | Facility: CLINIC | Age: 69
End: 2024-04-10
Payer: MEDICARE

## 2024-04-10 NOTE — TELEPHONE ENCOUNTER
Patient is calling in and asking for a jury duty letter.  Patient states that she was summoned to be on the grand jury and she states that she just can not do it she has no way downtown and it would just be to painful.  Patient can be reached at [phone].      Thank You

## 2024-04-26 ENCOUNTER — OFFICE VISIT (OUTPATIENT)
Dept: PRIMARY CARE | Facility: CLINIC | Age: 69
End: 2024-04-26
Payer: MEDICARE

## 2024-04-26 VITALS
TEMPERATURE: 97.6 F | SYSTOLIC BLOOD PRESSURE: 135 MMHG | HEART RATE: 60 BPM | BODY MASS INDEX: 54.56 KG/M2 | WEIGHT: 293 LBS | RESPIRATION RATE: 20 BRPM | OXYGEN SATURATION: 93 % | DIASTOLIC BLOOD PRESSURE: 58 MMHG

## 2024-04-26 DIAGNOSIS — I48.11 LONGSTANDING PERSISTENT ATRIAL FIBRILLATION (MULTI): ICD-10-CM

## 2024-04-26 DIAGNOSIS — E11.9 TYPE 2 DIABETES MELLITUS WITHOUT COMPLICATION, WITHOUT LONG-TERM CURRENT USE OF INSULIN (MULTI): ICD-10-CM

## 2024-04-26 LAB — POC INR: 2.2 (ref 0.9–1.1)

## 2024-04-26 PROCEDURE — 96372 THER/PROPH/DIAG INJ SC/IM: CPT | Performed by: FAMILY MEDICINE

## 2024-04-26 PROCEDURE — 99213 OFFICE O/P EST LOW 20 MIN: CPT | Performed by: FAMILY MEDICINE

## 2024-04-26 PROCEDURE — 85610 PROTHROMBIN TIME: CPT | Performed by: FAMILY MEDICINE

## 2024-04-26 RX ORDER — GLIPIZIDE 5 MG/1
5 TABLET ORAL 2 TIMES DAILY
Qty: 360 TABLET | Refills: 0 | Status: SHIPPED | OUTPATIENT
Start: 2024-04-26

## 2024-04-26 RX ORDER — BLOOD SUGAR DIAGNOSTIC
STRIP MISCELLANEOUS
Qty: 100 STRIP | Refills: 5 | Status: SHIPPED | OUTPATIENT
Start: 2024-04-26

## 2024-04-26 RX ADMIN — CYANOCOBALAMIN 1000 MCG: 1000 INJECTION, SOLUTION INTRAMUSCULAR; SUBCUTANEOUS at 09:52

## 2024-05-22 DIAGNOSIS — E11.9 TYPE 2 DIABETES MELLITUS WITHOUT COMPLICATION, WITHOUT LONG-TERM CURRENT USE OF INSULIN (MULTI): ICD-10-CM

## 2024-05-22 RX ORDER — GLIPIZIDE 5 MG/1
10 TABLET ORAL 2 TIMES DAILY
Qty: 360 TABLET | Refills: 0 | OUTPATIENT
Start: 2024-05-22

## 2024-05-22 NOTE — TELEPHONE ENCOUNTER
Pt last OV 4/26/24  Last medication fill 4/26/24 for 6 month supply.   Refill not appropriate at this time.

## 2024-06-05 DIAGNOSIS — E78.5 HYPERLIPIDEMIA, UNSPECIFIED HYPERLIPIDEMIA TYPE: ICD-10-CM

## 2024-06-05 DIAGNOSIS — F32.A DEPRESSION, UNSPECIFIED DEPRESSION TYPE: ICD-10-CM

## 2024-06-05 RX ORDER — SERTRALINE HYDROCHLORIDE 100 MG/1
200 TABLET, FILM COATED ORAL NIGHTLY
Qty: 180 TABLET | Refills: 1 | Status: SHIPPED | OUTPATIENT
Start: 2024-06-05 | End: 2024-12-02

## 2024-06-05 RX ORDER — ROSUVASTATIN CALCIUM 20 MG/1
20 TABLET, COATED ORAL EVERY EVENING
Qty: 90 TABLET | Refills: 1 | Status: SHIPPED | OUTPATIENT
Start: 2024-06-05 | End: 2024-12-02

## 2024-06-05 NOTE — TELEPHONE ENCOUNTER
Last OV 4/26/24  Requested Prescriptions     Pending Prescriptions Disp Refills    rosuvastatin (Crestor) 20 mg tablet [Pharmacy Med Name: Rosuvastatin Calcium 20 MG Oral Tablet] 90 tablet 1     Sig: Take 1 tablet (20 mg) by mouth once daily in the evening.    sertraline (Zoloft) 100 mg tablet [Pharmacy Med Name: Sertraline HCl 100 MG Oral Tablet] 180 tablet 1     Sig: Take 2 tablets (200 mg) by mouth once daily at bedtime.

## 2024-06-18 DIAGNOSIS — E11.9 TYPE 2 DIABETES MELLITUS WITHOUT COMPLICATION, WITH LONG-TERM CURRENT USE OF INSULIN (MULTI): ICD-10-CM

## 2024-06-18 DIAGNOSIS — Z79.4 TYPE 2 DIABETES MELLITUS WITHOUT COMPLICATION, WITH LONG-TERM CURRENT USE OF INSULIN (MULTI): ICD-10-CM

## 2024-06-18 RX ORDER — SEMAGLUTIDE 0.68 MG/ML
0.5 INJECTION, SOLUTION SUBCUTANEOUS
Qty: 6 ML | Refills: 2 | OUTPATIENT
Start: 2024-06-23 | End: 2024-12-20

## 2024-06-18 NOTE — TELEPHONE ENCOUNTER
Pt last OV 4/26/2024  Requested Prescriptions     Pending Prescriptions Disp Refills    semaglutide (Ozempic) 0.25 mg or 0.5 mg (2 mg/3 mL) pen injector [Pharmacy Med Name: OZEMPIC  0.25MG 0.5MG SOLUTION PEN-INJECTOR  PEN DOSE] 6 mL 2     Sig: Inject 0.5 mg under the skin 1 (one) time per week.

## 2024-06-18 NOTE — TELEPHONE ENCOUNTER
Patient also called into request this medication.  Patient can be reached at [phone].      Thank You

## 2024-06-19 ENCOUNTER — TELEPHONE (OUTPATIENT)
Dept: PRIMARY CARE | Facility: CLINIC | Age: 69
End: 2024-06-19
Payer: MEDICARE

## 2024-06-19 DIAGNOSIS — E11.9 TYPE 2 DIABETES MELLITUS WITHOUT COMPLICATION, WITH LONG-TERM CURRENT USE OF INSULIN (MULTI): Primary | ICD-10-CM

## 2024-06-19 DIAGNOSIS — Z79.4 TYPE 2 DIABETES MELLITUS WITHOUT COMPLICATION, WITH LONG-TERM CURRENT USE OF INSULIN (MULTI): Primary | ICD-10-CM

## 2024-06-19 NOTE — TELEPHONE ENCOUNTER
Patient called in this morning stating that the pharmacy wants to charge her $400.00 for the Ozempic and she can not afford that.  Patient is asking if there is another medication that she can take instead that would be cheaper?  Patient can be reached at [phone].      Thank You

## 2024-06-19 NOTE — TELEPHONE ENCOUNTER
Please contact patient and let her know we are going to have the clinical pharmacists that work in our office see if they can do anything about this.

## 2024-06-26 ENCOUNTER — APPOINTMENT (OUTPATIENT)
Dept: PRIMARY CARE | Facility: CLINIC | Age: 69
End: 2024-06-26
Payer: MEDICARE

## 2024-07-02 ENCOUNTER — LAB (OUTPATIENT)
Dept: LAB | Facility: LAB | Age: 69
End: 2024-07-02
Payer: MEDICARE

## 2024-07-02 ENCOUNTER — APPOINTMENT (OUTPATIENT)
Dept: PRIMARY CARE | Facility: CLINIC | Age: 69
End: 2024-07-02
Payer: MEDICARE

## 2024-07-02 VITALS
BODY MASS INDEX: 52.61 KG/M2 | RESPIRATION RATE: 20 BRPM | OXYGEN SATURATION: 95 % | DIASTOLIC BLOOD PRESSURE: 68 MMHG | SYSTOLIC BLOOD PRESSURE: 137 MMHG | HEART RATE: 60 BPM | TEMPERATURE: 97.5 F | WEIGHT: 293 LBS

## 2024-07-02 DIAGNOSIS — Z79.4 TYPE 2 DIABETES MELLITUS WITHOUT COMPLICATION, WITH LONG-TERM CURRENT USE OF INSULIN (MULTI): ICD-10-CM

## 2024-07-02 DIAGNOSIS — I48.11 LONGSTANDING PERSISTENT ATRIAL FIBRILLATION (MULTI): ICD-10-CM

## 2024-07-02 DIAGNOSIS — E11.9 TYPE 2 DIABETES MELLITUS WITHOUT COMPLICATION, WITH LONG-TERM CURRENT USE OF INSULIN (MULTI): ICD-10-CM

## 2024-07-02 DIAGNOSIS — Z79.01 ANTICOAGULATED ON COUMADIN: ICD-10-CM

## 2024-07-02 DIAGNOSIS — E11.9 TYPE 2 DIABETES MELLITUS WITHOUT COMPLICATION, WITH LONG-TERM CURRENT USE OF INSULIN (MULTI): Primary | ICD-10-CM

## 2024-07-02 DIAGNOSIS — Z79.4 TYPE 2 DIABETES MELLITUS WITHOUT COMPLICATION, WITH LONG-TERM CURRENT USE OF INSULIN (MULTI): Primary | ICD-10-CM

## 2024-07-02 LAB
INR PPP: 1.6 (ref 0.9–1.1)
PROTHROMBIN TIME: 17.8 SECONDS (ref 9.8–12.8)

## 2024-07-02 PROCEDURE — 83036 HEMOGLOBIN GLYCOSYLATED A1C: CPT

## 2024-07-02 PROCEDURE — 85610 PROTHROMBIN TIME: CPT

## 2024-07-02 PROCEDURE — 96372 THER/PROPH/DIAG INJ SC/IM: CPT | Performed by: FAMILY MEDICINE

## 2024-07-02 PROCEDURE — 99213 OFFICE O/P EST LOW 20 MIN: CPT | Performed by: FAMILY MEDICINE

## 2024-07-02 PROCEDURE — 36415 COLL VENOUS BLD VENIPUNCTURE: CPT

## 2024-07-02 NOTE — PROGRESS NOTES
"Subjective   Patient ID: Bree Elam is a 68 y.o. female who presents for Follow-up (INR-/MENTIONS HER WEIGHT LOSS HAS STOPPED SINCE SHE HAS STOPPED THE OZEMPIC. IT $ AND CANNOT AFFORD. SHE HAS AN APPT WITH PHARM ON 7/11. ) and B12 Injection (3/4).    HPI  Patient comes in today for checkup and monitoring of her medication. She has no voiced complaints and no evidence of bleeding or other problems from the Coumadin.    Patient also here today for follow-up on her Ozempic medication.  She has been on Ozempic since March at the introductory dose and has lost 34 pounds thus far.  I tried increasing her dose to 1 mg/week however patient claims that with the increased dose, the price of the medicine jumped from $130 a month to over $400 a month which she cannot afford.    4/26/2024  Patient comes in today with her daughter Nelly for checkup and monitoring of her medication. She has no voiced complaints and no evidence of bleeding or other problems from the Coumadin.  Her INR today was 2.2.  As noted above she started Ozempic 3 to 4 weeks ago and thus far has lost 21 pounds overall since February by using Mediterranean diet and the Ozempic.  She has been getting \"crashes\" almost daily.  Will have her decrease her glipizide to 5 mg twice daily.  She is due for B12 shot today as well.    Review of Systems   All other systems reviewed and are negative.      Objective   Vitals:  /68   Pulse 60   Temp 36.4 °C (97.5 °F)   Resp 20   Wt 135 kg (297 lb)   LMP  (LMP Unknown)   SpO2 95%   BMI 52.61 kg/m²     Physical Exam  Vitals reviewed.   Constitutional:       Appearance: She is well-developed. She is morbidly obese.   HENT:      Head: Normocephalic and atraumatic.      Right Ear: Tympanic membrane, ear canal and external ear normal.      Left Ear: Tympanic membrane, ear canal and external ear normal.      Nose: Nose normal.      Mouth/Throat:      Mouth: Mucous membranes are moist.      Pharynx: " Oropharynx is clear.   Eyes:      Extraocular Movements: Extraocular movements intact.      Conjunctiva/sclera: Conjunctivae normal.      Pupils: Pupils are equal, round, and reactive to light.   Cardiovascular:      Rate and Rhythm: Normal rate and regular rhythm.      Heart sounds: Normal heart sounds. No murmur heard.  Pulmonary:      Effort: Pulmonary effort is normal.      Breath sounds: Normal breath sounds. No wheezing.   Abdominal:      General: Abdomen is flat. Bowel sounds are normal.      Palpations: Abdomen is soft.   Musculoskeletal:         General: Normal range of motion.   Skin:     General: Skin is warm and dry.   Neurological:      General: No focal deficit present.      Mental Status: She is alert and oriented to person, place, and time. Mental status is at baseline.   Psychiatric:         Mood and Affect: Mood normal.         Behavior: Behavior normal.         Thought Content: Thought content normal.         Judgment: Judgment normal.       Assessment/Plan   Problem List Items Addressed This Visit       Atrial fibrillation (Multi)    Overview     Chronic Coumadin 3 mg daily  INR 1.6 today         Relevant Orders    Protime-INR    Diabetes mellitus, type 2 (Multi) - Primary    Overview     Recent weight loss of 34 pounds.    HbA1c 6.1 today.           Relevant Orders    Hemoglobin A1C (Completed)   Will contact patient with lab results when available.  Continue current Coumadin meds.  RTC in one month for recheck, sooner should problems arise.  Medication list reconciled.  Thank you for visiting today!      Professional services: Some of this note was completed using Dragon voice technology and sometimes the software misinterprets words. This may include unintended errors with respect to translation of words, typographical errors or grammar errors which may not have been identified prior to finalization of the chart note. Please take this into account when reading the note. Thank you.                  Edis Patel DO 07/03/24 8:13 AM

## 2024-07-03 ENCOUNTER — TELEPHONE (OUTPATIENT)
Dept: PRIMARY CARE | Facility: CLINIC | Age: 69
End: 2024-07-03
Payer: MEDICARE

## 2024-07-03 LAB
EST. AVERAGE GLUCOSE BLD GHB EST-MCNC: 128 MG/DL
HBA1C MFR BLD: 6.1 %

## 2024-07-03 NOTE — RESULT ENCOUNTER NOTE
Please notify patient of excellent improvement in her sugar going from 7.4 back in February to now 6.1!  Congratulations!  Continue to watch the diet and will recheck again in 3 months.    Also her INR was low at 1.6 but will continue to have her take her current dose of Coumadin and will recheck again here in a month.

## 2024-07-03 NOTE — TELEPHONE ENCOUNTER
----- Message from Edis Patel DO sent at 7/3/2024  1:32 PM EDT -----  Please notify patient of excellent improvement in her sugar going from 7.4 back in February to now 6.1!  Congratulations!  Continue to watch the diet and will recheck again in 3 months.    Also her INR was low at 1.6 but will continue to have her take her current dose of Coumadin and will recheck again here in a month.

## 2024-07-08 DIAGNOSIS — E11.9 TYPE 2 DIABETES MELLITUS WITHOUT COMPLICATION, WITHOUT LONG-TERM CURRENT USE OF INSULIN (MULTI): ICD-10-CM

## 2024-07-09 RX ORDER — GLIPIZIDE 5 MG/1
10 TABLET ORAL 2 TIMES DAILY
Qty: 360 TABLET | Refills: 1 | Status: SHIPPED | OUTPATIENT
Start: 2024-07-09 | End: 2025-01-05

## 2024-07-09 NOTE — TELEPHONE ENCOUNTER
Last OV 7/2/24  Requested Prescriptions     Pending Prescriptions Disp Refills    glipiZIDE (Glucotrol) 5 mg tablet [Pharmacy Med Name: glipiZIDE 5 MG Oral Tablet] 360 tablet 1     Sig: Take 2 tablets (10 mg) by mouth 2 times a day.

## 2024-07-11 ENCOUNTER — APPOINTMENT (OUTPATIENT)
Dept: PHARMACY | Facility: HOSPITAL | Age: 69
End: 2024-07-11
Payer: MEDICARE

## 2024-07-11 DIAGNOSIS — E11.9 TYPE 2 DIABETES MELLITUS WITHOUT COMPLICATION, WITH LONG-TERM CURRENT USE OF INSULIN (MULTI): ICD-10-CM

## 2024-07-11 DIAGNOSIS — Z79.4 TYPE 2 DIABETES MELLITUS WITHOUT COMPLICATION, WITH LONG-TERM CURRENT USE OF INSULIN (MULTI): ICD-10-CM

## 2024-07-12 NOTE — PROGRESS NOTES
Subjective     Patient ID: Bree Elam is a 68 y.o. female who presents for diabetes management.    Diabetes  She presents for her initial diabetic visit. She has type 2 diabetes mellitus. Risk factors for coronary artery disease include dyslipidemia, obesity, hypertension and diabetes mellitus. Current diabetic treatment includes oral agent (dual therapy) and insulin injections. An ACE inhibitor/angiotensin II receptor blocker is not being taken.     Allergies   Allergen Reactions    Nsaids (Non-Steroidal Anti-Inflammatory Drug) Unknown     Objective     Current DM Pharmacotherapy:   -glipizide 10mg bid  -novolin prn sliding scale  -metformin 1,000 mg bid  -Ozempic (unable to get due to coat)    SECONDARY PREVENTION  - Statin? Yes  - ACE-I/ARB? No  - Aspirin? Yes    Current monitoring regimen:   Patient is using: glucometer    SMBG Readings: none to report    Any episodes of hypoglycemia? No  Hypoglycemia awareness? No    LMP  (LMP Unknown)     Pertinent PMH Review:  - PMH of Pancreatitis: No  - PMH/FH of Medullary Thyroid Cancer: No  - PMH of Retinopathy: No  - PMH of Urinary Tract Infections: No    Lab Review  Lab Results   Component Value Date    BILITOT 0.3 02/26/2024    CALCIUM 9.5 02/26/2024    CO2 30 02/26/2024     02/26/2024    CREATININE 0.96 02/26/2024    GLUCOSE 170 (H) 02/26/2024    ALKPHOS 65 02/26/2024    K 4.3 02/26/2024    PROT 6.6 02/26/2024     02/26/2024    AST 19 02/26/2024    ALT 16 02/26/2024    BUN 21 02/26/2024    ANIONGAP 13 02/26/2024    MG 1.60 10/22/2020    ALBUMIN 4.2 02/26/2024    GFRF 70 02/24/2023     Lab Results   Component Value Date    TRIG 149 02/26/2024    CHOL 140 02/26/2024    LDLCALC 61 02/26/2024    HDL 48.8 02/26/2024     Lab Results   Component Value Date    HGBA1C 6.1 (H) 07/02/2024     The 10-year ASCVD risk score (J-oAnn RODRIGUEZ, et al., 2019) is: 21.6%    Values used to calculate the score:      Age: 68 years      Sex: Female      Is Non-  : No      Diabetic: Yes      Tobacco smoker: No      Systolic Blood Pressure: 137 mmHg      Is BP treated: Yes      HDL Cholesterol: 48.8 mg/dL      Total Cholesterol: 140 mg/dL    Assessment/Plan     Problem List Items Addressed This Visit       Diabetes mellitus, type 2 (Multi)   Patient has been off Ozempic for about 2 to 3 weeks. Ran test claim and Ozempic was $47 per month. Patient reports that copay is had for her to pay every month. Assessed patient of  PAP and she seems to qualify. Patient will drop off 1099 tax form 7/17 for patient assistance application. Will follow up in 1 week to submit application.     Patient Assistance Screening (VAF)    Patient verbally reports monthly or yearly income which is less than 400% federal poverty level     Application for program has been submitted for the following medications: Ozempic    Patient has been informed that program team will be reaching out to them to discuss necessary documentation, instructed to answer phone/return voicemail.     Patient aware this process may take up to 6 weeks.     If approved medication must be filled through [facility] pharmacy and may be picked up or mailed to patient.     Ozempic Education:     - Counseled patient on Ozempic MOA, expectations, side effects, duration of therapy, administration, and monitoring parameters.  - Provided detailed dosing and administration counseling to ensure proper technique.   - Reviewed Ozempic titration schedule, starting with 0.25 mg once weekly for 4 weeks, then continuing on 0.5 mg once weekly. Pt verbalized understanding.  - Counseled patient on the benefits of GLP-1ra, such as cardiovascular risk reduction, glycemic control, and weight loss potential.  - Reviewed storage requirements of Ozempic when not in use, and when to administer the medication if a dose is missed.  - Advised patient that they may experience improved satiety after meals and portion sizes of meals may be  reduced as doses of Ozempic increase.  - Counseled patient to avoid foods that are fatty/oily as this may precipitate the nausea/GI upset that may occur with new start Ozempic.     Type 2 diabetes mellitus, is at goal. <7%    Follow up: I recommend diabetes care be 1 week.    Romana Bolden, PharmD    Continue all meds under the continuation of care with the referring provider and clinical pharmacy team

## 2024-07-17 ENCOUNTER — APPOINTMENT (OUTPATIENT)
Dept: PHARMACY | Facility: HOSPITAL | Age: 69
End: 2024-07-17
Payer: MEDICARE

## 2024-07-17 DIAGNOSIS — E11.9 TYPE 2 DIABETES MELLITUS WITHOUT COMPLICATION, WITH LONG-TERM CURRENT USE OF INSULIN (MULTI): ICD-10-CM

## 2024-07-17 DIAGNOSIS — Z79.4 TYPE 2 DIABETES MELLITUS WITHOUT COMPLICATION, WITH LONG-TERM CURRENT USE OF INSULIN (MULTI): ICD-10-CM

## 2024-07-18 PROCEDURE — RXMED WILLOW AMBULATORY MEDICATION CHARGE

## 2024-07-22 ENCOUNTER — PHARMACY VISIT (OUTPATIENT)
Dept: PHARMACY | Facility: CLINIC | Age: 69
End: 2024-07-22
Payer: COMMERCIAL

## 2024-07-24 ENCOUNTER — APPOINTMENT (OUTPATIENT)
Dept: PHARMACY | Facility: HOSPITAL | Age: 69
End: 2024-07-24
Payer: MEDICARE

## 2024-07-24 DIAGNOSIS — E11.9 TYPE 2 DIABETES MELLITUS WITHOUT COMPLICATION, WITH LONG-TERM CURRENT USE OF INSULIN (MULTI): ICD-10-CM

## 2024-07-24 DIAGNOSIS — Z79.4 TYPE 2 DIABETES MELLITUS WITHOUT COMPLICATION, WITH LONG-TERM CURRENT USE OF INSULIN (MULTI): ICD-10-CM

## 2024-07-24 NOTE — PROGRESS NOTES
Subjective     Patient ID: Bree Elam is a 68 y.o. female who presents for diabetes management.    Diabetes  She presents for her follow-up diabetic visit. She has type 2 diabetes mellitus. Risk factors for coronary artery disease include dyslipidemia, obesity, hypertension and diabetes mellitus. Current diabetic treatment includes oral agent (dual therapy) and insulin injections. An ACE inhibitor/angiotensin II receptor blocker is not being taken.     Allergies   Allergen Reactions    Nsaids (Non-Steroidal Anti-Inflammatory Drug) Unknown     Objective     Current DM Pharmacotherapy:   -glipizide 10mg bid  -novolin prn sliding scale  -metformin 1,000 mg bid  -Ozempic (unable to get due to coat)    SECONDARY PREVENTION  - Statin? Yes  - ACE-I/ARB? No  - Aspirin? Yes    Current monitoring regimen:   Patient is using: glucometer    SMBG Readings: none to report    Any episodes of hypoglycemia? No  Hypoglycemia awareness? No    LMP  (LMP Unknown)     Pertinent PMH Review:  - PMH of Pancreatitis: No  - PMH/FH of Medullary Thyroid Cancer: No  - PMH of Retinopathy: No  - PMH of Urinary Tract Infections: No    Lab Review  Lab Results   Component Value Date    BILITOT 0.3 02/26/2024    CALCIUM 9.5 02/26/2024    CO2 30 02/26/2024     02/26/2024    CREATININE 0.96 02/26/2024    GLUCOSE 170 (H) 02/26/2024    ALKPHOS 65 02/26/2024    K 4.3 02/26/2024    PROT 6.6 02/26/2024     02/26/2024    AST 19 02/26/2024    ALT 16 02/26/2024    BUN 21 02/26/2024    ANIONGAP 13 02/26/2024    MG 1.60 10/22/2020    ALBUMIN 4.2 02/26/2024    GFRF 70 02/24/2023     Lab Results   Component Value Date    TRIG 149 02/26/2024    CHOL 140 02/26/2024    LDLCALC 61 02/26/2024    HDL 48.8 02/26/2024     Lab Results   Component Value Date    HGBA1C 6.1 (H) 07/02/2024     The 10-year ASCVD risk score (Jo-Ann RODRIGUEZ, et al., 2019) is: 21.6%    Values used to calculate the score:      Age: 68 years      Sex: Female      Is Non-  : No      Diabetic: Yes      Tobacco smoker: No      Systolic Blood Pressure: 137 mmHg      Is BP treated: Yes      HDL Cholesterol: 48.8 mg/dL      Total Cholesterol: 140 mg/dL    Assessment/Plan     Problem List Items Addressed This Visit       Diabetes mellitus, type 2 (Multi)   Patients [facility] PAP was approved until 7/18/2025. Patient started Ozempic and had no issues with restarting medication. Will follow up in 3 weeks for dose titration.    Type 2 diabetes mellitus, is at goal. <7%    Follow up: I recommend diabetes care be 3 weeks.    Romana Bolden, PharmD    Continue all meds under the continuation of care with the referring provider and clinical pharmacy team

## 2024-07-28 DIAGNOSIS — E11.9 TYPE 2 DIABETES MELLITUS WITHOUT COMPLICATION, WITHOUT LONG-TERM CURRENT USE OF INSULIN (MULTI): ICD-10-CM

## 2024-07-29 RX ORDER — METFORMIN HYDROCHLORIDE 500 MG/1
1000 TABLET ORAL
Qty: 360 TABLET | Refills: 1 | Status: SHIPPED | OUTPATIENT
Start: 2024-07-29 | End: 2025-01-25

## 2024-07-29 NOTE — TELEPHONE ENCOUNTER
Pt last OV 7/2/2024  Requested Prescriptions     Pending Prescriptions Disp Refills    metFORMIN (Glucophage) 500 mg tablet [Pharmacy Med Name: metFORMIN HCl 500 MG Oral Tablet] 360 tablet 1     Sig: Take 2 tablets (1,000 mg) by mouth 2 times daily (morning and late afternoon).

## 2024-08-04 DIAGNOSIS — I10 HYPERTENSION, UNSPECIFIED TYPE: ICD-10-CM

## 2024-08-05 ENCOUNTER — HOSPITAL ENCOUNTER (OUTPATIENT)
Dept: RADIOLOGY | Facility: CLINIC | Age: 69
Discharge: HOME | End: 2024-08-05
Payer: MEDICARE

## 2024-08-05 ENCOUNTER — APPOINTMENT (OUTPATIENT)
Dept: PRIMARY CARE | Facility: CLINIC | Age: 69
End: 2024-08-05
Payer: MEDICARE

## 2024-08-05 VITALS
RESPIRATION RATE: 16 BRPM | WEIGHT: 292 LBS | TEMPERATURE: 97.5 F | SYSTOLIC BLOOD PRESSURE: 143 MMHG | DIASTOLIC BLOOD PRESSURE: 74 MMHG | OXYGEN SATURATION: 93 % | BODY MASS INDEX: 51.74 KG/M2 | HEIGHT: 63 IN | HEART RATE: 60 BPM

## 2024-08-05 DIAGNOSIS — M25.562 ACUTE PAIN OF LEFT KNEE: ICD-10-CM

## 2024-08-05 DIAGNOSIS — E53.8 B12 DEFICIENCY: ICD-10-CM

## 2024-08-05 DIAGNOSIS — E66.01 MORBID OBESITY WITH BMI OF 50.0-59.9, ADULT (MULTI): ICD-10-CM

## 2024-08-05 DIAGNOSIS — Z79.4 TYPE 2 DIABETES MELLITUS WITHOUT COMPLICATION, WITH LONG-TERM CURRENT USE OF INSULIN (MULTI): ICD-10-CM

## 2024-08-05 DIAGNOSIS — E11.9 TYPE 2 DIABETES MELLITUS WITHOUT COMPLICATION, WITH LONG-TERM CURRENT USE OF INSULIN (MULTI): ICD-10-CM

## 2024-08-05 DIAGNOSIS — I48.11 LONGSTANDING PERSISTENT ATRIAL FIBRILLATION (MULTI): ICD-10-CM

## 2024-08-05 DIAGNOSIS — M25.562 ACUTE PAIN OF LEFT KNEE: Primary | ICD-10-CM

## 2024-08-05 LAB — POC INR: 2.1 (ref 0.9–1.1)

## 2024-08-05 PROCEDURE — 99214 OFFICE O/P EST MOD 30 MIN: CPT | Performed by: FAMILY MEDICINE

## 2024-08-05 PROCEDURE — 96372 THER/PROPH/DIAG INJ SC/IM: CPT | Performed by: FAMILY MEDICINE

## 2024-08-05 PROCEDURE — 73562 X-RAY EXAM OF KNEE 3: CPT | Mod: LT

## 2024-08-05 PROCEDURE — 85610 PROTHROMBIN TIME: CPT | Performed by: FAMILY MEDICINE

## 2024-08-05 PROCEDURE — 73562 X-RAY EXAM OF KNEE 3: CPT | Mod: LEFT SIDE | Performed by: RADIOLOGY

## 2024-08-05 RX ORDER — CYANOCOBALAMIN 1000 UG/ML
1000 INJECTION, SOLUTION INTRAMUSCULAR; SUBCUTANEOUS
Status: SHIPPED | OUTPATIENT
Start: 2024-08-05 | End: 2024-10-04

## 2024-08-05 NOTE — PROGRESS NOTES
"Subjective   Patient ID: Bree Elam is a 68 y.o. female who presents for Follow-up (INR- 2.1/Sugars are doing fine and she can normally tell if her sugars are low and is able to bring it easily with juice or her sugar tablets. ) and B12 Injection (3/4).  HPI  Patient comes in today for 1 month checkup and refill of medications.  She is also due for her B12 shot #3 of 4.  She has resumed taking the Ozempic as her insurance is covering it now again and she has lost another 5 pounds since she was here last month.  She has lost a total of 37 pounds since starting the Ozempic back in March.    Patient's INR today is 2.1.    Review of Systems   All other systems reviewed and are negative.      Objective   Vitals:  /74   Pulse 60   Temp 36.4 °C (97.5 °F)   Resp 16   Ht 1.6 m (5' 3\")   Wt 132 kg (292 lb)   LMP  (LMP Unknown)   SpO2 93%   BMI 51.73 kg/m²     Physical Exam  Vitals reviewed.   Constitutional:       Appearance: She is well-developed. She is morbidly obese.   HENT:      Head: Normocephalic and atraumatic.      Right Ear: Tympanic membrane, ear canal and external ear normal.      Left Ear: Tympanic membrane, ear canal and external ear normal.      Nose: Nose normal.      Mouth/Throat:      Mouth: Mucous membranes are moist.      Pharynx: Oropharynx is clear.   Eyes:      Extraocular Movements: Extraocular movements intact.      Conjunctiva/sclera: Conjunctivae normal.      Pupils: Pupils are equal, round, and reactive to light.   Cardiovascular:      Rate and Rhythm: Normal rate and regular rhythm.      Heart sounds: Normal heart sounds. No murmur heard.  Pulmonary:      Effort: Pulmonary effort is normal.      Breath sounds: Normal breath sounds. No wheezing.   Abdominal:      General: Abdomen is flat. Bowel sounds are normal.      Palpations: Abdomen is soft.   Musculoskeletal:         General: Tenderness (Pain medial side of the left knee) present. Normal range of motion.   Skin:     " PATIENT HAS BEEN SCREENED AND CATEGORIZED AS LOW NUTRITION RISK. PATIENT WILL BE SEEN WITHIN 
7 DAYS OF ADMISSION.



2/8/18



[address] General: Skin is warm and dry.   Neurological:      General: No focal deficit present.      Mental Status: She is alert and oriented to person, place, and time. Mental status is at baseline.   Psychiatric:         Mood and Affect: Mood normal.         Behavior: Behavior normal.         Thought Content: Thought content normal.         Judgment: Judgment normal.         Assessment/Plan   Problem List Items Addressed This Visit       Atrial fibrillation (Multi)    Overview     Chronic Coumadin 3 mg daily  INR 2.1 today         Relevant Orders    POCT INR manually resulted (Completed)    B12 deficiency    Overview     B12 shots x 4  Third today 8/5/2024         Relevant Medications    cyanocobalamin (Vitamin B-12) injection 1,000 mcg (Start on 8/5/2024  9:30 AM)    Diabetes mellitus, type 2 (Multi)    Overview     Recent weight loss of 37 pounds since 2/26/2024.    Using Ozempic and other meds listed           Morbid obesity with BMI of 50.0-59.9, adult (Multi)    Overview     Has lost 37 pounds with Mediterranean diet and Ozempic  Approved for Ozempic 4/1/2024          Other Visit Diagnoses       Acute pain of left knee    -  Primary    Relevant Orders    XR knee left 3 views        Will contact patient with x-ray results when they are available.  Continue all current meds.  RTC in one month for recheck, sooner should problems arise.  Medication list reconciled.  Thank you for visiting today!      Professional services: Some of this note was completed using Dragon voice technology and sometimes the software misinterprets words. This may include unintended errors with respect to translation of words, typographical errors or grammar errors which may not have been identified prior to finalization of the chart note. Please take this into account when reading the note. Thank you.       Edis Patel,  08/05/24 9:24 AM

## 2024-08-06 RX ORDER — AMLODIPINE BESYLATE 10 MG/1
10 TABLET ORAL DAILY
Qty: 90 TABLET | Refills: 1 | Status: SHIPPED | OUTPATIENT
Start: 2024-08-06 | End: 2025-02-02

## 2024-08-06 NOTE — TELEPHONE ENCOUNTER
Pt last OV 8/6/24    Requested Prescriptions     Pending Prescriptions Disp Refills    amLODIPine (Norvasc) 10 mg tablet [Pharmacy Med Name: amLODIPine Besylate 10 MG Oral Tablet] 90 tablet 1     Sig: Take 1 tablet (10 mg) by mouth once daily. as directed

## 2024-08-09 NOTE — RESULT ENCOUNTER NOTE
Edson Giron,    The x-ray of your left knee is showing mild degenerative changes.  Since you are on Coumadin you cannot use Advil or Aleve but you can use Tylenol 1000 mg 3 times a day for aches and pains.  Follow-up in the office in 4 weeks for recheck if symptoms not improved, sooner if condition worsens.    Have a Great Day and Weekend!!!    Dr. Patel

## 2024-08-14 ENCOUNTER — APPOINTMENT (OUTPATIENT)
Dept: PHARMACY | Facility: HOSPITAL | Age: 69
End: 2024-08-14
Payer: MEDICARE

## 2024-08-14 DIAGNOSIS — E11.9 TYPE 2 DIABETES MELLITUS WITHOUT COMPLICATION, WITH LONG-TERM CURRENT USE OF INSULIN (MULTI): ICD-10-CM

## 2024-08-14 DIAGNOSIS — Z79.4 TYPE 2 DIABETES MELLITUS WITHOUT COMPLICATION, WITH LONG-TERM CURRENT USE OF INSULIN (MULTI): ICD-10-CM

## 2024-08-14 PROCEDURE — RXMED WILLOW AMBULATORY MEDICATION CHARGE

## 2024-08-14 RX ORDER — SEMAGLUTIDE 1.34 MG/ML
1 INJECTION, SOLUTION SUBCUTANEOUS
Qty: 3 ML | Refills: 0 | Status: SHIPPED | OUTPATIENT
Start: 2024-08-18 | End: 2024-09-15

## 2024-08-14 NOTE — PROGRESS NOTES
Subjective     Patient ID: Bree Elam is a 68 y.o. female who presents for diabetes management.    Diabetes  She presents for her follow-up diabetic visit. She has type 2 diabetes mellitus. Risk factors for coronary artery disease include dyslipidemia, obesity, hypertension and diabetes mellitus. Current diabetic treatment includes oral agent (dual therapy) and insulin injections. An ACE inhibitor/angiotensin II receptor blocker is not being taken.     Allergies   Allergen Reactions    Nsaids (Non-Steroidal Anti-Inflammatory Drug) Unknown     Objective     Current DM Pharmacotherapy:   -glipizide 5mg bid  -metformin 1,000 mg bid  -Ozempic 0.5mg weekly    SECONDARY PREVENTION  - Statin? Yes  - ACE-I/ARB? No  - Aspirin? Yes    Current monitoring regimen:   Patient is using: glucometer    SMBG Readings: 170 to 180 in the morning    Any episodes of hypoglycemia? No  Hypoglycemia awareness? No    LMP  (LMP Unknown)     Pertinent PMH Review:  - PMH of Pancreatitis: No  - PMH/FH of Medullary Thyroid Cancer: No  - PMH of Retinopathy: No  - PMH of Urinary Tract Infections: No    Lab Review  Lab Results   Component Value Date    BILITOT 0.3 02/26/2024    CALCIUM 9.5 02/26/2024    CO2 30 02/26/2024     02/26/2024    CREATININE 0.96 02/26/2024    GLUCOSE 170 (H) 02/26/2024    ALKPHOS 65 02/26/2024    K 4.3 02/26/2024    PROT 6.6 02/26/2024     02/26/2024    AST 19 02/26/2024    ALT 16 02/26/2024    BUN 21 02/26/2024    ANIONGAP 13 02/26/2024    MG 1.60 10/22/2020    ALBUMIN 4.2 02/26/2024    GFRF 70 02/24/2023     Lab Results   Component Value Date    TRIG 149 02/26/2024    CHOL 140 02/26/2024    LDLCALC 61 02/26/2024    HDL 48.8 02/26/2024     Lab Results   Component Value Date    HGBA1C 6.1 (H) 07/02/2024     The 10-year ASCVD risk score (Jo-Ann RODRIGUEZ, et al., 2019) is: 23.3%    Values used to calculate the score:      Age: 68 years      Sex: Female      Is Non- : No      Diabetic:  Yes      Tobacco smoker: No      Systolic Blood Pressure: 143 mmHg      Is BP treated: Yes      HDL Cholesterol: 48.8 mg/dL      Total Cholesterol: 140 mg/dL    Assessment/Plan     Problem List Items Addressed This Visit       Diabetes mellitus, type 2 (Multi)   Patients had no issues with Ozempic 0.5 mg weekly. Patient had issues with low readings around lunch. Fasting BG is still high at 170 to 180 in the am. Will decrease glipizide to 5 mg daily to help prevent further hypoglycemia and will increase Ozempic to 1 mg. Will follow up in 4 weeks for dose titration.    Type 2 diabetes mellitus, is at goal. <7%    DECREASE glipizide to 5mg daily hs  INCREASE Ozempic to 1 mg weekly  Follow up: I recommend diabetes care be 4 weeks.    Romana Bolden, PharmD    Continue all meds under the continuation of care with the referring provider and clinical pharmacy team

## 2024-08-15 ENCOUNTER — PHARMACY VISIT (OUTPATIENT)
Dept: PHARMACY | Facility: CLINIC | Age: 69
End: 2024-08-15
Payer: COMMERCIAL

## 2024-08-18 DIAGNOSIS — I48.91 ATRIAL FIBRILLATION, UNSPECIFIED TYPE (MULTI): ICD-10-CM

## 2024-08-19 DIAGNOSIS — I10 HYPERTENSION, UNSPECIFIED TYPE: ICD-10-CM

## 2024-08-19 DIAGNOSIS — I48.91 ATRIAL FIBRILLATION, UNSPECIFIED TYPE (MULTI): ICD-10-CM

## 2024-08-19 RX ORDER — WARFARIN 3 MG/1
3 TABLET ORAL EVERY EVENING
Qty: 90 TABLET | Refills: 0 | OUTPATIENT
Start: 2024-08-19

## 2024-08-19 RX ORDER — WARFARIN 3 MG/1
3 TABLET ORAL EVERY EVENING
Qty: 90 TABLET | Refills: 1 | Status: SHIPPED | OUTPATIENT
Start: 2024-08-19

## 2024-08-19 RX ORDER — SOTALOL HYDROCHLORIDE 120 MG/1
120 TABLET ORAL 2 TIMES DAILY
Qty: 180 TABLET | Refills: 1 | Status: SHIPPED | OUTPATIENT
Start: 2024-08-19 | End: 2025-02-15

## 2024-08-19 NOTE — TELEPHONE ENCOUNTER
Pt last OV 8/5/2024  Requested Prescriptions     Pending Prescriptions Disp Refills    sotalol (Betapace) 120 mg tablet [Pharmacy Med Name: SOTALOL HCL 120MG   TAB] 180 tablet 1     Sig: Take 1 tablet (120 mg) by mouth 2 times a day.    warfarin (Coumadin) 3 mg tablet 90 tablet 1     Sig: Take 1 tablet (3 mg) by mouth once daily in the evening.

## 2024-08-26 DIAGNOSIS — I10 HYPERTENSION, UNSPECIFIED TYPE: ICD-10-CM

## 2024-08-27 RX ORDER — HYDROCHLOROTHIAZIDE 25 MG/1
25 TABLET ORAL DAILY
Qty: 90 TABLET | Refills: 1 | Status: SHIPPED | OUTPATIENT
Start: 2024-08-27 | End: 2025-02-23

## 2024-08-27 NOTE — TELEPHONE ENCOUNTER
Pt last OV 8/5/2024     Requested Prescriptions     Pending Prescriptions Disp Refills    hydroCHLOROthiazide (HYDRODiuril) 25 mg tablet [Pharmacy Med Name: hydroCHLOROthiazide 25 MG Oral Tablet] 90 tablet 1     Sig: Take 1 tablet (25 mg) by mouth once daily. as directed

## 2024-09-05 ENCOUNTER — APPOINTMENT (OUTPATIENT)
Dept: PRIMARY CARE | Facility: CLINIC | Age: 69
End: 2024-09-05
Payer: MEDICARE

## 2024-09-05 VITALS
SYSTOLIC BLOOD PRESSURE: 102 MMHG | TEMPERATURE: 97.9 F | BODY MASS INDEX: 50.5 KG/M2 | WEIGHT: 285 LBS | HEART RATE: 60 BPM | DIASTOLIC BLOOD PRESSURE: 60 MMHG | HEIGHT: 63 IN | RESPIRATION RATE: 20 BRPM

## 2024-09-05 DIAGNOSIS — E11.9 TYPE 2 DIABETES MELLITUS WITHOUT COMPLICATION, WITHOUT LONG-TERM CURRENT USE OF INSULIN (MULTI): Primary | ICD-10-CM

## 2024-09-05 DIAGNOSIS — Z79.01 WARFARIN ANTICOAGULATION: ICD-10-CM

## 2024-09-05 DIAGNOSIS — E66.01 MORBID OBESITY WITH BMI OF 50.0-59.9, ADULT (MULTI): ICD-10-CM

## 2024-09-05 LAB — POC INR: 2.3 (ref 0.9–1.1)

## 2024-09-05 PROCEDURE — 85610 PROTHROMBIN TIME: CPT | Performed by: FAMILY MEDICINE

## 2024-09-05 PROCEDURE — 3060F POS MICROALBUMINURIA REV: CPT | Performed by: FAMILY MEDICINE

## 2024-09-05 PROCEDURE — 1159F MED LIST DOCD IN RCRD: CPT | Performed by: FAMILY MEDICINE

## 2024-09-05 PROCEDURE — 3078F DIAST BP <80 MM HG: CPT | Performed by: FAMILY MEDICINE

## 2024-09-05 PROCEDURE — 1036F TOBACCO NON-USER: CPT | Performed by: FAMILY MEDICINE

## 2024-09-05 PROCEDURE — 3074F SYST BP LT 130 MM HG: CPT | Performed by: FAMILY MEDICINE

## 2024-09-05 PROCEDURE — 3048F LDL-C <100 MG/DL: CPT | Performed by: FAMILY MEDICINE

## 2024-09-05 PROCEDURE — 3044F HG A1C LEVEL LT 7.0%: CPT | Performed by: FAMILY MEDICINE

## 2024-09-05 PROCEDURE — 99213 OFFICE O/P EST LOW 20 MIN: CPT | Performed by: FAMILY MEDICINE

## 2024-09-05 PROCEDURE — 3008F BODY MASS INDEX DOCD: CPT | Performed by: FAMILY MEDICINE

## 2024-09-05 NOTE — PROGRESS NOTES
"Subjective   Patient ID: Bree Elam is a 68 y.o. female who presents for Follow-up (Pt says she is her to check her INR).    HPI   Patient presents today for 1-month checkup and refill of meds. She currently is without complaints. She states that she is taking her medicines as directed and is not having any side effects from them.  Her INR today is 2.3.    She has been having some episodes of low blood sugars at night 64 and 70 for example.  She has lost another 7 pounds since she was here last month and a total of 44 pounds since February with the Ozempic.  She is doing very well.    Patient has having some anxiety over finances.  Her son is still living with her and he is not working.  She and he both have dogs.  She is very concerned about the escalating costs of food as well as her other expenses.  Her current situation is not allowing her to save any money for the future.    Review of Systems   All other systems reviewed and are negative.      Objective   /60 (BP Location: Other (Comment)) Comment (BP Location): left wrist  Pulse 60   Temp 36.6 °C (97.9 °F)   Resp 20   Ht 1.6 m (5' 3\")   Wt 129 kg (285 lb)   LMP  (LMP Unknown)   BMI 50.49 kg/m²     Physical Exam  Vitals reviewed.   Constitutional:       Appearance: She is well-developed. She is morbidly obese.   HENT:      Head: Normocephalic and atraumatic.      Right Ear: Tympanic membrane, ear canal and external ear normal.      Left Ear: Tympanic membrane, ear canal and external ear normal.      Nose: Nose normal.      Mouth/Throat:      Mouth: Mucous membranes are moist.      Pharynx: Oropharynx is clear.   Eyes:      Extraocular Movements: Extraocular movements intact.      Conjunctiva/sclera: Conjunctivae normal.      Pupils: Pupils are equal, round, and reactive to light.   Cardiovascular:      Rate and Rhythm: Normal rate and regular rhythm.      Heart sounds: Normal heart sounds. No murmur heard.  Pulmonary:      Effort: " Pulmonary effort is normal.      Breath sounds: Normal breath sounds. No wheezing.   Abdominal:      General: Abdomen is flat. Bowel sounds are normal.      Palpations: Abdomen is soft.   Musculoskeletal:         General: Tenderness (Pain medial side of the left knee) present. Normal range of motion.   Skin:     General: Skin is warm and dry.   Neurological:      General: No focal deficit present.      Mental Status: She is alert and oriented to person, place, and time. Mental status is at baseline.   Psychiatric:         Mood and Affect: Mood normal.         Behavior: Behavior normal.         Thought Content: Thought content normal.         Judgment: Judgment normal.       Assessment/Plan   Problem List Items Addressed This Visit       Diabetes mellitus, type 2 (Multi) - Primary    Overview     Recent weight loss of 44 pounds since 2/26/2024.    Using Ozempic and other meds listed  DC glipizide due to hypoglycemic episodes           Morbid obesity with BMI of 50.0-59.9, adult (Multi)    Overview     Has lost 44 pounds with Mediterranean diet and Ozempic  Approved for Ozempic 3/26/2024         Warfarin anticoagulation    Relevant Orders    POCT INR manually resulted (Completed)   DC glipizide.  Continue current Coumadin meds.  RTC in one month for recheck, sooner should problems arise.  Medication list reconciled.  Thank you for visiting today!      Professional services: Some of this note was completed using Dragon voice technology and sometimes the software misinterprets words. This may include unintended errors with respect to translation of words, typographical errors or grammar errors which may not have been identified prior to finalization of the chart note. Please take this into account when reading the note. Thank you.

## 2024-09-11 ENCOUNTER — APPOINTMENT (OUTPATIENT)
Dept: PHARMACY | Facility: HOSPITAL | Age: 69
End: 2024-09-11
Payer: MEDICARE

## 2024-09-11 DIAGNOSIS — Z79.4 TYPE 2 DIABETES MELLITUS WITHOUT COMPLICATION, WITH LONG-TERM CURRENT USE OF INSULIN (MULTI): ICD-10-CM

## 2024-09-11 DIAGNOSIS — E11.9 TYPE 2 DIABETES MELLITUS WITHOUT COMPLICATION, WITH LONG-TERM CURRENT USE OF INSULIN (MULTI): ICD-10-CM

## 2024-09-11 PROCEDURE — RXMED WILLOW AMBULATORY MEDICATION CHARGE

## 2024-09-11 RX ORDER — SEMAGLUTIDE 2.68 MG/ML
2 INJECTION, SOLUTION SUBCUTANEOUS
Qty: 3 ML | Refills: 0 | Status: SHIPPED | OUTPATIENT
Start: 2024-09-15 | End: 2024-10-13

## 2024-09-11 NOTE — PROGRESS NOTES
Subjective     Patient ID: Bree Elam is a 68 y.o. female who presents for diabetes management.    Diabetes  She presents for her follow-up diabetic visit. She has type 2 diabetes mellitus. Risk factors for coronary artery disease include dyslipidemia, obesity, hypertension and diabetes mellitus. Current diabetic treatment includes oral agent (dual therapy) and insulin injections. An ACE inhibitor/angiotensin II receptor blocker is not being taken.     Allergies   Allergen Reactions    Nsaids (Non-Steroidal Anti-Inflammatory Drug) Unknown     Objective     Current DM Pharmacotherapy:   -metformin 1,000 mg bid  -Ozempic 1mg weekly    SECONDARY PREVENTION  - Statin? Yes  - ACE-I/ARB? No  - Aspirin? Yes    Current monitoring regimen:   Patient is using: glucometer    SMBG Readings: 110 to 120 in the morning    Any episodes of hypoglycemia? No  Hypoglycemia awareness? No    LMP  (LMP Unknown)     Pertinent PMH Review:  - PMH of Pancreatitis: No  - PMH/FH of Medullary Thyroid Cancer: No  - PMH of Retinopathy: No  - PMH of Urinary Tract Infections: No    Lab Review  Lab Results   Component Value Date    BILITOT 0.3 02/26/2024    CALCIUM 9.5 02/26/2024    CO2 30 02/26/2024     02/26/2024    CREATININE 0.96 02/26/2024    GLUCOSE 170 (H) 02/26/2024    ALKPHOS 65 02/26/2024    K 4.3 02/26/2024    PROT 6.6 02/26/2024     02/26/2024    AST 19 02/26/2024    ALT 16 02/26/2024    BUN 21 02/26/2024    ANIONGAP 13 02/26/2024    MG 1.60 10/22/2020    ALBUMIN 4.2 02/26/2024    GFRF 70 02/24/2023     Lab Results   Component Value Date    TRIG 149 02/26/2024    CHOL 140 02/26/2024    LDLCALC 61 02/26/2024    HDL 48.8 02/26/2024     Lab Results   Component Value Date    HGBA1C 6.1 (H) 07/02/2024     The 10-year ASCVD risk score (Jo-Ann RODRIGUEZ, et al., 2019) is: 12.5%    Values used to calculate the score:      Age: 68 years      Sex: Female      Is Non- : No      Diabetic: Yes      Tobacco  smoker: No      Systolic Blood Pressure: 102 mmHg      Is BP treated: Yes      HDL Cholesterol: 48.8 mg/dL      Total Cholesterol: 140 mg/dL    Assessment/Plan     Problem List Items Addressed This Visit       Diabetes mellitus, type 2 (Multi)    Relevant Medications    semaglutide (Ozempic) 2 mg/dose (8 mg/3 mL) pen injector (Start on 9/15/2024)   Patients had no issues with Ozempic 1 mg weekly. Patient continued to have issues with low readings around lunch, despite decreasing glipizide from 2 tablets to 1 tablet. PCP discontinued glipizide to prevent further hypoglycemia. Will increase Ozempic to 2 mg. Will follow up in 4 weeks for dose titration.    Type 2 diabetes mellitus, is at goal. <7%    INCREASE Ozempic to 2 mg weekly  Follow up: I recommend diabetes care be 4 weeks.    Romana Bolden, PharmD    Continue all meds under the continuation of care with the referring provider and clinical pharmacy team

## 2024-09-13 ENCOUNTER — PHARMACY VISIT (OUTPATIENT)
Dept: PHARMACY | Facility: CLINIC | Age: 69
End: 2024-09-13
Payer: COMMERCIAL

## 2024-10-02 ENCOUNTER — APPOINTMENT (OUTPATIENT)
Dept: PHARMACY | Facility: HOSPITAL | Age: 69
End: 2024-10-02
Payer: MEDICARE

## 2024-10-02 DIAGNOSIS — Z79.4 TYPE 2 DIABETES MELLITUS WITHOUT COMPLICATION, WITH LONG-TERM CURRENT USE OF INSULIN (MULTI): ICD-10-CM

## 2024-10-02 DIAGNOSIS — E11.9 TYPE 2 DIABETES MELLITUS WITHOUT COMPLICATION, WITH LONG-TERM CURRENT USE OF INSULIN (MULTI): ICD-10-CM

## 2024-10-02 PROCEDURE — RXMED WILLOW AMBULATORY MEDICATION CHARGE

## 2024-10-02 RX ORDER — SEMAGLUTIDE 2.68 MG/ML
2 INJECTION, SOLUTION SUBCUTANEOUS
Qty: 9 ML | Refills: 0 | Status: SHIPPED | OUTPATIENT
Start: 2024-10-06 | End: 2024-12-29

## 2024-10-02 NOTE — PROGRESS NOTES
Subjective     Patient ID: Bree Elam is a 68 y.o. female who presents for diabetes management.    Diabetes  She presents for her follow-up diabetic visit. She has type 2 diabetes mellitus. Risk factors for coronary artery disease include dyslipidemia, obesity, hypertension and diabetes mellitus. Current diabetic treatment includes oral agent (dual therapy) and insulin injections. An ACE inhibitor/angiotensin II receptor blocker is not being taken.     Allergies   Allergen Reactions    Nsaids (Non-Steroidal Anti-Inflammatory Drug) Unknown     Objective     Current DM Pharmacotherapy:   -metformin 1,000 mg bid  -Ozempic 2mg weekly    SECONDARY PREVENTION  - Statin? Yes  - ACE-I/ARB? No  - Aspirin? Yes    Current monitoring regimen:   Patient is using: glucometer    SMBG Readings: 110 to 120 in the morning    Any episodes of hypoglycemia? No  Hypoglycemia awareness? No    LMP  (LMP Unknown)     Pertinent PMH Review:  - PMH of Pancreatitis: No  - PMH/FH of Medullary Thyroid Cancer: No  - PMH of Retinopathy: No  - PMH of Urinary Tract Infections: No    Lab Review  Lab Results   Component Value Date    BILITOT 0.3 02/26/2024    CALCIUM 9.5 02/26/2024    CO2 30 02/26/2024     02/26/2024    CREATININE 0.96 02/26/2024    GLUCOSE 170 (H) 02/26/2024    ALKPHOS 65 02/26/2024    K 4.3 02/26/2024    PROT 6.6 02/26/2024     02/26/2024    AST 19 02/26/2024    ALT 16 02/26/2024    BUN 21 02/26/2024    ANIONGAP 13 02/26/2024    MG 1.60 10/22/2020    ALBUMIN 4.2 02/26/2024    GFRF 70 02/24/2023     Lab Results   Component Value Date    TRIG 149 02/26/2024    CHOL 140 02/26/2024    LDLCALC 61 02/26/2024    HDL 48.8 02/26/2024     Lab Results   Component Value Date    HGBA1C 6.1 (H) 07/02/2024     The 10-year ASCVD risk score (Jo-Ann RODRIGUEZ, et al., 2019) is: 18.8%    Values used to calculate the score:      Age: 68 years      Sex: Female      Is Non- : No      Diabetic: Yes      Tobacco  smoker: No      Systolic Blood Pressure: 127 mmHg      Is BP treated: Yes      HDL Cholesterol: 48.8 mg/dL      Total Cholesterol: 140 mg/dL    Assessment/Plan     Problem List Items Addressed This Visit       Diabetes mellitus, type 2 (Multi)    Relevant Medications    semaglutide (Ozempic) 2 mg/dose (8 mg/3 mL) pen injector (Start on 10/6/2024)   Patients had no issues with Ozempic 2 mg weekly. Patient reports hypoglycemia has stopped since discontinuing the glipizide. Down to 277 lbs today. Will increase Ozempic to 2 mg. Patient will get updated A1C over the next month. Will follow up in 4 weeks for A1C lab work review.    Type 2 diabetes mellitus, is at goal. <7%    CONTINUE Ozempic 2 mg weekly  Follow up: I recommend diabetes care be 4 weeks.    Romana Bolden, PharmD    Continue all meds under the continuation of care with the referring provider and clinical pharmacy team

## 2024-10-03 ENCOUNTER — PHARMACY VISIT (OUTPATIENT)
Dept: PHARMACY | Facility: CLINIC | Age: 69
End: 2024-10-03
Payer: COMMERCIAL

## 2024-10-04 ENCOUNTER — LAB (OUTPATIENT)
Dept: LAB | Facility: LAB | Age: 69
End: 2024-10-04
Payer: MEDICARE

## 2024-10-04 ENCOUNTER — APPOINTMENT (OUTPATIENT)
Dept: PRIMARY CARE | Facility: CLINIC | Age: 69
End: 2024-10-04
Payer: MEDICARE

## 2024-10-04 VITALS
BODY MASS INDEX: 49.6 KG/M2 | WEIGHT: 280 LBS | TEMPERATURE: 97.7 F | DIASTOLIC BLOOD PRESSURE: 82 MMHG | SYSTOLIC BLOOD PRESSURE: 127 MMHG | RESPIRATION RATE: 16 BRPM | HEART RATE: 60 BPM | OXYGEN SATURATION: 93 %

## 2024-10-04 DIAGNOSIS — E11.9 TYPE 2 DIABETES MELLITUS WITHOUT COMPLICATION, WITHOUT LONG-TERM CURRENT USE OF INSULIN (MULTI): ICD-10-CM

## 2024-10-04 DIAGNOSIS — E53.8 VITAMIN B12 DEFICIENCY: ICD-10-CM

## 2024-10-04 DIAGNOSIS — K21.9 GASTROESOPHAGEAL REFLUX DISEASE WITHOUT ESOPHAGITIS: ICD-10-CM

## 2024-10-04 DIAGNOSIS — Z23 ENCOUNTER FOR IMMUNIZATION: ICD-10-CM

## 2024-10-04 DIAGNOSIS — I48.11 LONGSTANDING PERSISTENT ATRIAL FIBRILLATION (MULTI): Primary | ICD-10-CM

## 2024-10-04 DIAGNOSIS — Z79.01 WARFARIN ANTICOAGULATION: ICD-10-CM

## 2024-10-04 LAB
EST. AVERAGE GLUCOSE BLD GHB EST-MCNC: 131 MG/DL
HBA1C MFR BLD: 6.2 %
POC INR: 2 (ref 0.9–1.1)
VIT B12 SERPL-MCNC: 357 PG/ML (ref 211–911)

## 2024-10-04 PROCEDURE — 83036 HEMOGLOBIN GLYCOSYLATED A1C: CPT

## 2024-10-04 PROCEDURE — 85610 PROTHROMBIN TIME: CPT | Performed by: FAMILY MEDICINE

## 2024-10-04 PROCEDURE — 90662 IIV NO PRSV INCREASED AG IM: CPT | Performed by: FAMILY MEDICINE

## 2024-10-04 PROCEDURE — 36415 COLL VENOUS BLD VENIPUNCTURE: CPT

## 2024-10-04 PROCEDURE — 99214 OFFICE O/P EST MOD 30 MIN: CPT | Performed by: FAMILY MEDICINE

## 2024-10-04 PROCEDURE — 82607 VITAMIN B-12: CPT

## 2024-10-04 PROCEDURE — G0008 ADMIN INFLUENZA VIRUS VAC: HCPCS | Performed by: FAMILY MEDICINE

## 2024-10-04 RX ORDER — PANTOPRAZOLE SODIUM 40 MG/1
40 TABLET, DELAYED RELEASE ORAL
Qty: 90 TABLET | Refills: 1 | Status: SHIPPED | OUTPATIENT
Start: 2024-10-04 | End: 2025-04-02

## 2024-10-04 ASSESSMENT — ENCOUNTER SYMPTOMS
OCCASIONAL FEELINGS OF UNSTEADINESS: 0
LOSS OF SENSATION IN FEET: 0
DEPRESSION: 0

## 2024-10-04 ASSESSMENT — PATIENT HEALTH QUESTIONNAIRE - PHQ9
2. FEELING DOWN, DEPRESSED OR HOPELESS: NOT AT ALL
1. LITTLE INTEREST OR PLEASURE IN DOING THINGS: NOT AT ALL
SUM OF ALL RESPONSES TO PHQ9 QUESTIONS 1 AND 2: 0

## 2024-10-04 NOTE — PROGRESS NOTES
Subjective   Patient ID: Bree Elam is a 68 y.o. female who presents for Follow-up (1 mo fu for INR- 2.0).    HPI  Patient comes in today for checkup and monitoring of her medication. She has no voiced complaints and no evidence of bleeding or other problems from the Coumadin.  Her INR today is 2.0.    Patient also asking about her B12 shots.  She last had a B12 level back in February and apparently only completed 2 of recommended 4 B12 shots monthly.  Will recheck her B12 level today and see where she is.    Patient also continues to do well with the Ozempic medication.  She has lost another 5 pounds since last month and a total of 50 pounds since July 2023.    Patient would also like a flu vaccine today.    Review of Systems   All other systems reviewed and are negative.      Objective   Vitals:  /82   Pulse 60   Temp 36.5 °C (97.7 °F)   Resp 16   Wt 127 kg (280 lb)   LMP  (LMP Unknown)   SpO2 93%   BMI 49.60 kg/m²     Physical Exam  Vitals reviewed.   Constitutional:       Appearance: She is well-developed. She is morbidly obese.   HENT:      Head: Normocephalic and atraumatic.      Right Ear: Tympanic membrane, ear canal and external ear normal.      Left Ear: Tympanic membrane, ear canal and external ear normal.      Nose: Nose normal.      Mouth/Throat:      Mouth: Mucous membranes are moist.      Pharynx: Oropharynx is clear.   Eyes:      Extraocular Movements: Extraocular movements intact.      Conjunctiva/sclera: Conjunctivae normal.      Pupils: Pupils are equal, round, and reactive to light.   Cardiovascular:      Rate and Rhythm: Normal rate and regular rhythm.      Heart sounds: Normal heart sounds. No murmur heard.  Pulmonary:      Effort: Pulmonary effort is normal.      Breath sounds: Normal breath sounds. No wheezing.   Abdominal:      General: Abdomen is flat. Bowel sounds are normal.      Palpations: Abdomen is soft.   Musculoskeletal:         General: Tenderness (Pain  medial side of the left knee) present. Normal range of motion.   Skin:     General: Skin is warm and dry.   Neurological:      General: No focal deficit present.      Mental Status: She is alert and oriented to person, place, and time. Mental status is at baseline.   Psychiatric:         Mood and Affect: Mood normal.         Behavior: Behavior normal.         Thought Content: Thought content normal.         Judgment: Judgment normal.       Assessment/Plan   Problem List Items Addressed This Visit       Atrial fibrillation (Multi) - Primary    Overview     Chronic Coumadin 3 mg daily  INR 2.0 today         Relevant Orders    POCT INR manually resulted (Completed)    Vitamin B12 deficiency    Overview     B12 recheck today         Relevant Orders    Vitamin B12    Diabetes mellitus, type 2 (Multi)    Overview     Total weight loss of 50 pounds since 2/26/2024.    Using Ozempic and other meds listed  DC glipizide due to hypoglycemic episodes           Relevant Orders    Hemoglobin A1C    GERD (gastroesophageal reflux disease)    Relevant Medications    pantoprazole (ProtoNix) 40 mg EC tablet    Warfarin anticoagulation    Relevant Orders    POCT INR manually resulted (Completed)    Encounter for immunization    Relevant Orders    Flu vaccine, trivalent, preservative free, HIGH-DOSE, age 65y+ (Fluzone) (Completed)   Will contact patient with lab results when available.  Continue all current meds.  RTC in one month for recheck, sooner should problems arise.  Medication list reconciled.  Thank you for visiting today!      Professional services: Some of this note was completed using Dragon voice technology and sometimes the software misinterprets words. This may include unintended errors with respect to translation of words, typographical errors or grammar errors which may not have been identified prior to finalization of the chart note. Please take this into account when reading the note. Thank you.       Edis Patel,   10/04/24 9:18 AM

## 2024-10-30 ENCOUNTER — APPOINTMENT (OUTPATIENT)
Dept: PHARMACY | Facility: HOSPITAL | Age: 69
End: 2024-10-30
Payer: MEDICARE

## 2024-10-30 DIAGNOSIS — E11.9 TYPE 2 DIABETES MELLITUS WITHOUT COMPLICATION, WITH LONG-TERM CURRENT USE OF INSULIN (MULTI): ICD-10-CM

## 2024-10-30 DIAGNOSIS — Z79.4 TYPE 2 DIABETES MELLITUS WITHOUT COMPLICATION, WITH LONG-TERM CURRENT USE OF INSULIN (MULTI): ICD-10-CM

## 2024-11-02 RX ORDER — SEMAGLUTIDE 2.68 MG/ML
2 INJECTION, SOLUTION SUBCUTANEOUS
Qty: 9 ML | Refills: 3 | Status: SHIPPED | OUTPATIENT
Start: 2024-11-03 | End: 2025-10-05

## 2024-11-05 ENCOUNTER — APPOINTMENT (OUTPATIENT)
Dept: PRIMARY CARE | Facility: CLINIC | Age: 69
End: 2024-11-05
Payer: MEDICARE

## 2024-11-06 ENCOUNTER — OFFICE VISIT (OUTPATIENT)
Dept: PRIMARY CARE | Facility: CLINIC | Age: 69
End: 2024-11-06
Payer: MEDICARE

## 2024-11-06 VITALS
TEMPERATURE: 97.7 F | SYSTOLIC BLOOD PRESSURE: 150 MMHG | DIASTOLIC BLOOD PRESSURE: 77 MMHG | BODY MASS INDEX: 48.89 KG/M2 | HEART RATE: 59 BPM | RESPIRATION RATE: 20 BRPM | OXYGEN SATURATION: 93 % | WEIGHT: 276 LBS

## 2024-11-06 DIAGNOSIS — Z12.31 ENCOUNTER FOR SCREENING MAMMOGRAM FOR BREAST CANCER: ICD-10-CM

## 2024-11-06 DIAGNOSIS — G89.29 CHRONIC PAIN OF LEFT KNEE: ICD-10-CM

## 2024-11-06 DIAGNOSIS — E66.01 MORBID OBESITY WITH BMI OF 45.0-49.9, ADULT (MULTI): ICD-10-CM

## 2024-11-06 DIAGNOSIS — I48.11 LONGSTANDING PERSISTENT ATRIAL FIBRILLATION (MULTI): ICD-10-CM

## 2024-11-06 DIAGNOSIS — M25.562 CHRONIC PAIN OF LEFT KNEE: ICD-10-CM

## 2024-11-06 DIAGNOSIS — Z79.01 WARFARIN ANTICOAGULATION: Primary | ICD-10-CM

## 2024-11-06 LAB — POC INR: 2.6 (ref 0.9–1.1)

## 2024-11-06 PROCEDURE — 99214 OFFICE O/P EST MOD 30 MIN: CPT | Performed by: FAMILY MEDICINE

## 2024-11-06 PROCEDURE — 85610 PROTHROMBIN TIME: CPT | Performed by: FAMILY MEDICINE

## 2024-11-06 NOTE — PROGRESS NOTES
Subjective   Patient ID: Bree Elam is a 68 y.o. female who presents for Follow-up (1 month follow up for INR- 2.6).    HPI  Patient comes in today for checkup and monitoring of her medication. She has no voiced complaints and no evidence of bleeding or other problems from the Coumadin.  Her INR today is 2.6.    She has lost another 4 pounds since she was here last month and has lost a total now of 53 pounds since starting the Ozempic back in March 2024.      Review of Systems   All other systems reviewed and are negative.      Objective   Vitals:  /77   Pulse 59   Temp 36.5 °C (97.7 °F)   Resp 20   Wt 125 kg (276 lb)   LMP  (LMP Unknown)   SpO2 93%   BMI 48.89 kg/m²     Physical Exam  Vitals reviewed.   Constitutional:       Appearance: She is well-developed. She is morbidly obese.   HENT:      Head: Normocephalic and atraumatic.      Right Ear: Tympanic membrane, ear canal and external ear normal.      Left Ear: Tympanic membrane, ear canal and external ear normal.      Nose: Nose normal.      Mouth/Throat:      Mouth: Mucous membranes are moist.      Pharynx: Oropharynx is clear.   Eyes:      Extraocular Movements: Extraocular movements intact.      Conjunctiva/sclera: Conjunctivae normal.      Pupils: Pupils are equal, round, and reactive to light.   Cardiovascular:      Rate and Rhythm: Normal rate and regular rhythm.      Heart sounds: Normal heart sounds. No murmur heard.  Pulmonary:      Effort: Pulmonary effort is normal.      Breath sounds: Normal breath sounds. No wheezing.   Abdominal:      General: Abdomen is flat. Bowel sounds are normal.      Palpations: Abdomen is soft.   Musculoskeletal:         General: Tenderness (Pain medial side of the left knee) present. Normal range of motion.   Skin:     General: Skin is warm and dry.   Neurological:      General: No focal deficit present.      Mental Status: She is alert and oriented to person, place, and time. Mental status is at  baseline.   Psychiatric:         Mood and Affect: Mood normal.         Behavior: Behavior normal.         Thought Content: Thought content normal.         Judgment: Judgment normal.       Assessment/Plan   Problem List Items Addressed This Visit       Atrial fibrillation (Multi)    Overview     Chronic Coumadin 3 mg daily  INR 2.6 today         Morbid obesity with BMI of 45.0-49.9, adult (Multi)    Overview     Has lost 53 pounds with Mediterranean diet and Ozempic  Approved for Ozempic 3/26/2024         Warfarin anticoagulation - Primary    Relevant Orders    POCT INR manually resulted (Completed)    Follow Up In [facility] - PCP - Established    Encounter for screening mammogram for breast cancer    Relevant Orders    BI mammo bilateral screening tomosynthesis    Chronic pain of left knee   Will contact patient with mammogram results when available.  Continue current Coumadin meds.  RTC in one month for recheck, sooner should problems arise.  Medication list reconciled.  Thank you for visiting today!      Professional services: Some of this note was completed using Dragon voice technology and sometimes the software misinterprets words. This may include unintended errors with respect to translation of words, typographical errors or grammar errors which may not have been identified prior to finalization of the chart note. Please take this into account when reading the note. Thank you.       Edis Patel,  11/06/24 11:16 AM

## 2024-11-21 DIAGNOSIS — E11.9 TYPE 2 DIABETES MELLITUS WITHOUT COMPLICATION, WITHOUT LONG-TERM CURRENT USE OF INSULIN (MULTI): ICD-10-CM

## 2024-11-22 RX ORDER — BLOOD SUGAR DIAGNOSTIC
STRIP MISCELLANEOUS
Qty: 100 EACH | Refills: 0 | Status: SHIPPED | OUTPATIENT
Start: 2024-11-22

## 2024-11-30 DIAGNOSIS — E78.5 HYPERLIPIDEMIA, UNSPECIFIED HYPERLIPIDEMIA TYPE: ICD-10-CM

## 2024-11-30 DIAGNOSIS — F32.A DEPRESSION, UNSPECIFIED DEPRESSION TYPE: ICD-10-CM

## 2024-12-02 RX ORDER — ROSUVASTATIN CALCIUM 20 MG/1
20 TABLET, COATED ORAL EVERY EVENING
Qty: 90 TABLET | Refills: 1 | Status: SHIPPED | OUTPATIENT
Start: 2024-12-02 | End: 2025-05-31

## 2024-12-02 RX ORDER — SERTRALINE HYDROCHLORIDE 100 MG/1
200 TABLET, FILM COATED ORAL NIGHTLY
Qty: 180 TABLET | Refills: 1 | Status: SHIPPED | OUTPATIENT
Start: 2024-12-02 | End: 2025-05-31

## 2024-12-02 NOTE — TELEPHONE ENCOUNTER
Pt last OV 11/6/2024    Requested Prescriptions     Pending Prescriptions Disp Refills    rosuvastatin (Crestor) 20 mg tablet [Pharmacy Med Name: Rosuvastatin Calcium 20 MG Oral Tablet] 90 tablet 1     Sig: Take 1 tablet (20 mg) by mouth once daily in the evening.    sertraline (Zoloft) 100 mg tablet [Pharmacy Med Name: Sertraline HCl 100 MG Oral Tablet] 180 tablet 1     Sig: Take 2 tablets (200 mg) by mouth once daily at bedtime.

## 2024-12-04 ENCOUNTER — TELEPHONE (OUTPATIENT)
Dept: PRIMARY CARE | Facility: CLINIC | Age: 69
End: 2024-12-04
Payer: MEDICARE

## 2024-12-04 PROCEDURE — RXMED WILLOW AMBULATORY MEDICATION CHARGE

## 2024-12-04 NOTE — TELEPHONE ENCOUNTER
Patient is calling in and stating that she is on her last Ozempic and would like to speak with you.  Patient can be reached at [phone].        Thank You

## 2024-12-06 ENCOUNTER — HOSPITAL ENCOUNTER (OUTPATIENT)
Dept: RADIOLOGY | Facility: CLINIC | Age: 69
Discharge: HOME | End: 2024-12-06
Payer: MEDICARE

## 2024-12-06 VITALS — BODY MASS INDEX: 48.02 KG/M2 | WEIGHT: 271 LBS | HEIGHT: 63 IN

## 2024-12-06 DIAGNOSIS — Z12.31 ENCOUNTER FOR SCREENING MAMMOGRAM FOR BREAST CANCER: ICD-10-CM

## 2024-12-06 PROCEDURE — 77067 SCR MAMMO BI INCL CAD: CPT

## 2024-12-09 ENCOUNTER — APPOINTMENT (OUTPATIENT)
Dept: PRIMARY CARE | Facility: CLINIC | Age: 69
End: 2024-12-09
Payer: MEDICARE

## 2024-12-09 ENCOUNTER — PHARMACY VISIT (OUTPATIENT)
Dept: PHARMACY | Facility: CLINIC | Age: 69
End: 2024-12-09
Payer: COMMERCIAL

## 2024-12-09 VITALS
TEMPERATURE: 97 F | BODY MASS INDEX: 48.55 KG/M2 | RESPIRATION RATE: 16 BRPM | SYSTOLIC BLOOD PRESSURE: 120 MMHG | WEIGHT: 274 LBS | DIASTOLIC BLOOD PRESSURE: 77 MMHG | HEART RATE: 60 BPM | OXYGEN SATURATION: 93 %

## 2024-12-09 DIAGNOSIS — S50.11XA CONTUSION OF RIGHT FOREARM, INITIAL ENCOUNTER: ICD-10-CM

## 2024-12-09 DIAGNOSIS — G47.33 OSA ON CPAP: ICD-10-CM

## 2024-12-09 DIAGNOSIS — E66.01 MORBID OBESITY WITH BMI OF 45.0-49.9, ADULT (MULTI): ICD-10-CM

## 2024-12-09 DIAGNOSIS — I48.91 ATRIAL FIBRILLATION, UNSPECIFIED TYPE (MULTI): Primary | ICD-10-CM

## 2024-12-09 DIAGNOSIS — Z79.01 WARFARIN ANTICOAGULATION: ICD-10-CM

## 2024-12-09 LAB — POC INR: 1.3 (ref 0.9–1.1)

## 2024-12-09 PROCEDURE — 99214 OFFICE O/P EST MOD 30 MIN: CPT | Performed by: FAMILY MEDICINE

## 2024-12-09 PROCEDURE — 85610 PROTHROMBIN TIME: CPT | Performed by: FAMILY MEDICINE

## 2024-12-09 ASSESSMENT — PATIENT HEALTH QUESTIONNAIRE - PHQ9
SUM OF ALL RESPONSES TO PHQ9 QUESTIONS 1 AND 2: 0
1. LITTLE INTEREST OR PLEASURE IN DOING THINGS: NOT AT ALL
2. FEELING DOWN, DEPRESSED OR HOPELESS: NOT AT ALL

## 2024-12-09 ASSESSMENT — ENCOUNTER SYMPTOMS
LOSS OF SENSATION IN FEET: 0
DEPRESSION: 0
OCCASIONAL FEELINGS OF UNSTEADINESS: 0

## 2024-12-09 NOTE — PROGRESS NOTES
Subjective   Patient ID: Bree Elam is a 69 y.o. female who presents for Follow-up (1 month follow up for INR- 1.3/Fu on Ozempic- having a hard time losing weight. Hit a plateau. Sometimes gets a little constipated. ) and Fall (She was blowing leaves 2 weeks ago and tripped in the mud on to her blower. Her son helped her up. She has bruised right forearm and it has some bumps in there she would like to have looked at. ).    HPI  Patient comes in today for checkup and monitoring of her medication. She has no voiced complaints and no evidence of bleeding or other problems from the Coumadin.  Her INR level was low at 1.3 today.  She claims that she has missed only 1 dose of the medication.      She did have a fall about 2 weeks ago and bruised her right forearm.  It occurred while she was raking leaves in her yard.  She fell and struck the right forearm on the mud.  It appears to be in various stages of healing and she does have a marble size hematoma present.    Patient also feels like she may be hitting a plateau with her Ozempic medication.  She states she is not losing weight like she had been.  She has lost a total of 55 pounds since starting the Ozempic back in February 2024.    Review of Systems   All other systems reviewed and are negative.      Objective   Vitals:  /77   Pulse 60   Temp 36.1 °C (97 °F)   Resp 16   Wt 124 kg (274 lb)   LMP  (LMP Unknown)   SpO2 93%   BMI 48.55 kg/m²     Physical Exam  Vitals reviewed.   Constitutional:       Appearance: She is well-developed. She is morbidly obese.   HENT:      Head: Normocephalic and atraumatic.      Right Ear: Tympanic membrane, ear canal and external ear normal.      Left Ear: Tympanic membrane, ear canal and external ear normal.      Nose: Nose normal.      Mouth/Throat:      Mouth: Mucous membranes are moist.      Pharynx: Oropharynx is clear.   Eyes:      Extraocular Movements: Extraocular movements intact.       Conjunctiva/sclera: Conjunctivae normal.      Pupils: Pupils are equal, round, and reactive to light.   Cardiovascular:      Rate and Rhythm: Normal rate and regular rhythm.      Heart sounds: Normal heart sounds. No murmur heard.  Pulmonary:      Effort: Pulmonary effort is normal.      Breath sounds: Normal breath sounds. No wheezing.   Abdominal:      General: Abdomen is flat. Bowel sounds are normal.      Palpations: Abdomen is soft.   Musculoskeletal:         General: Tenderness (Pain medial side of the left knee) and signs of injury (Contusion/ecchymosis right forearm with small hematoma as described in the HPI) present. Normal range of motion.   Skin:     General: Skin is warm and dry.   Neurological:      General: No focal deficit present.      Mental Status: She is alert and oriented to person, place, and time. Mental status is at baseline.   Psychiatric:         Mood and Affect: Mood normal.         Behavior: Behavior normal.         Thought Content: Thought content normal.         Judgment: Judgment normal.       Assessment/Plan   Problem List Items Addressed This Visit       Atrial fibrillation (Multi) - Primary    Overview     Chronic Coumadin 3 mg daily  INR 1.3 today         Morbid obesity with BMI of 45.0-49.9, adult (Multi)    Overview     Has lost 55 pounds with Mediterranean diet and Ozempic  Approved for Ozempic 3/26/2024         Warfarin anticoagulation    Relevant Orders    POCT INR manually resulted (Completed)    NYDIA on CPAP    Overview     Has CPAP from medical service company  Uses CPAP daily @ 10 cm H2O room air           Other Visit Diagnoses       Contusion of right forearm, initial encounter            Continue current Coumadin meds.  RTC in one month for recheck, sooner should problems arise.  Medication list reconciled.  Thank you for visiting today!      Professional services: Some of this note was completed using Dragon voice technology and sometimes the software misinterprets  words. This may include unintended errors with respect to translation of words, typographical errors or grammar errors which may not have been identified prior to finalization of the chart note. Please take this into account when reading the note. Thank you.       Edis Patel DO 12/22/24 11:54 AM

## 2024-12-16 ENCOUNTER — TELEPHONE (OUTPATIENT)
Dept: PRIMARY CARE | Facility: CLINIC | Age: 69
End: 2024-12-16
Payer: MEDICARE

## 2024-12-16 NOTE — TELEPHONE ENCOUNTER
Please notify patient her mammograms were negative, recheck again in a year.  Have a Great Day and Happy Holidays!!!

## 2024-12-16 NOTE — TELEPHONE ENCOUNTER
Patient called in this morning stating that she would like to go over the results of her mammogram with the doctor.  Patient can be reached at [phone].        Thank You

## 2024-12-30 DIAGNOSIS — E11.9 TYPE 2 DIABETES MELLITUS WITHOUT COMPLICATION, WITHOUT LONG-TERM CURRENT USE OF INSULIN (MULTI): ICD-10-CM

## 2024-12-30 RX ORDER — BLOOD SUGAR DIAGNOSTIC
STRIP MISCELLANEOUS
Qty: 100 EACH | Refills: 3 | Status: SHIPPED | OUTPATIENT
Start: 2024-12-30

## 2024-12-30 NOTE — TELEPHONE ENCOUNTER
Pharmacy requests prescription below    Last Office Visit: 12/9/2024   Next Office Visit: 1/9/2025     Requested Prescriptions     Pending Prescriptions Disp Refills    OneTouch Ultra Test strip [Pharmacy Med Name: OneTouch Ultra Blue In Vitro Strip] 100 each 0     Sig: USE 1 STRIP TO CHECK GLUCOSE THREE TIMES DAILY

## 2025-01-09 ENCOUNTER — APPOINTMENT (OUTPATIENT)
Dept: PRIMARY CARE | Facility: CLINIC | Age: 70
End: 2025-01-09
Payer: MEDICARE

## 2025-01-10 ENCOUNTER — OFFICE VISIT (OUTPATIENT)
Dept: PRIMARY CARE | Facility: CLINIC | Age: 70
End: 2025-01-10
Payer: MEDICARE

## 2025-01-10 VITALS
TEMPERATURE: 97.3 F | WEIGHT: 266 LBS | BODY MASS INDEX: 47.13 KG/M2 | DIASTOLIC BLOOD PRESSURE: 71 MMHG | SYSTOLIC BLOOD PRESSURE: 132 MMHG | HEART RATE: 60 BPM | RESPIRATION RATE: 20 BRPM | OXYGEN SATURATION: 96 %

## 2025-01-10 DIAGNOSIS — Z79.01 WARFARIN ANTICOAGULATION: ICD-10-CM

## 2025-01-10 DIAGNOSIS — E78.2 HYPERLIPIDEMIA, MIXED: ICD-10-CM

## 2025-01-10 DIAGNOSIS — E66.01 MORBID OBESITY WITH BMI OF 45.0-49.9, ADULT (MULTI): ICD-10-CM

## 2025-01-10 DIAGNOSIS — F32.A DEPRESSION, UNSPECIFIED DEPRESSION TYPE: ICD-10-CM

## 2025-01-10 DIAGNOSIS — I10 HYPERTENSION, ESSENTIAL, BENIGN: ICD-10-CM

## 2025-01-10 DIAGNOSIS — I48.11 LONGSTANDING PERSISTENT ATRIAL FIBRILLATION (MULTI): Primary | ICD-10-CM

## 2025-01-10 DIAGNOSIS — E11.9 TYPE 2 DIABETES MELLITUS WITHOUT COMPLICATION, WITHOUT LONG-TERM CURRENT USE OF INSULIN (MULTI): ICD-10-CM

## 2025-01-10 LAB — POC INR: 1.9 (ref 0.9–1.1)

## 2025-01-10 PROCEDURE — 1036F TOBACCO NON-USER: CPT | Performed by: FAMILY MEDICINE

## 2025-01-10 PROCEDURE — 1159F MED LIST DOCD IN RCRD: CPT | Performed by: FAMILY MEDICINE

## 2025-01-10 PROCEDURE — 3075F SYST BP GE 130 - 139MM HG: CPT | Performed by: FAMILY MEDICINE

## 2025-01-10 PROCEDURE — 85610 PROTHROMBIN TIME: CPT | Performed by: FAMILY MEDICINE

## 2025-01-10 PROCEDURE — 3078F DIAST BP <80 MM HG: CPT | Performed by: FAMILY MEDICINE

## 2025-01-10 PROCEDURE — 1123F ACP DISCUSS/DSCN MKR DOCD: CPT | Performed by: FAMILY MEDICINE

## 2025-01-10 PROCEDURE — 99214 OFFICE O/P EST MOD 30 MIN: CPT | Performed by: FAMILY MEDICINE

## 2025-01-10 NOTE — PROGRESS NOTES
Subjective   Patient ID: Bree Elam is a 69 y.o. female who presents for Follow-up (Fu for weight loss and INR. /She is down 8 lbs since her last visit although she thought she hadn't lost weight/1 month follow up for INR- 1.9).    HPI  Patient comes in today for checkup and monitoring of her medication. She has no voiced complaints and no evidence of bleeding or other problems from the Coumadin.  Her INR level is 1.9 today.    Patient also continues to use the Ozempic with good success.  She has lost 8 pounds since she was here last month and down 63 pounds since she started the Ozempic last year.    Review of Systems   All other systems reviewed and are negative.      Objective   Vitals:  /71   Pulse 60   Temp 36.3 °C (97.3 °F)   Resp 20   Wt 121 kg (266 lb)   LMP  (LMP Unknown)   SpO2 96%   BMI 47.13 kg/m²     Physical Exam  Vitals reviewed.   Constitutional:       General: She is not in acute distress.     Appearance: She is well-developed. She is morbidly obese. She is not diaphoretic.   HENT:      Head: Normocephalic and atraumatic.      Right Ear: Tympanic membrane, ear canal and external ear normal.      Left Ear: Tympanic membrane, ear canal and external ear normal.      Nose: Nose normal.      Mouth/Throat:      Mouth: Mucous membranes are moist.      Pharynx: Oropharynx is clear.   Eyes:      General: No scleral icterus.     Extraocular Movements: Extraocular movements intact.      Conjunctiva/sclera: Conjunctivae normal.      Pupils: Pupils are equal, round, and reactive to light.   Neck:      Thyroid: No thyromegaly.      Vascular: No JVD.   Cardiovascular:      Rate and Rhythm: Normal rate and regular rhythm.      Heart sounds: Normal heart sounds. No murmur heard.     No friction rub. No gallop.   Pulmonary:      Effort: Pulmonary effort is normal. No respiratory distress.      Breath sounds: Normal breath sounds. No wheezing or rales.   Chest:      Chest wall: No tenderness.    Abdominal:      General: Abdomen is flat. Bowel sounds are normal. There is no distension.      Palpations: Abdomen is soft. There is no mass.      Tenderness: There is no abdominal tenderness. There is no rebound.   Musculoskeletal:         General: No swelling, tenderness or deformity. Normal range of motion.      Cervical back: Normal range of motion and neck supple.   Lymphadenopathy:      Cervical: No cervical adenopathy.   Skin:     General: Skin is warm and dry.      Coloration: Skin is not jaundiced.      Findings: No bruising or rash.   Neurological:      General: No focal deficit present.      Mental Status: She is alert and oriented to person, place, and time. Mental status is at baseline.      Cranial Nerves: No cranial nerve deficit.      Sensory: No sensory deficit.      Motor: No weakness.      Coordination: Coordination normal.      Gait: Gait normal.      Deep Tendon Reflexes: Reflexes normal.   Psychiatric:         Mood and Affect: Mood normal.         Behavior: Behavior normal.         Thought Content: Thought content normal.         Judgment: Judgment normal.         Assessment/Plan   Problem List Items Addressed This Visit       Atrial fibrillation (Multi) - Primary    Overview     Chronic Coumadin 3 mg daily  INR 1.9 today         Relevant Orders    POCT INR manually resulted (Completed)    RESOLVED: CKD stage 3 secondary to diabetes (Multi)    Depression    Diabetes mellitus, type 2 (Multi)    Overview     Total weight loss of 63 pounds since 2/26/2024.    Using Ozempic and other meds listed  DC glipizide due to hypoglycemic episodes           Morbid obesity with BMI of 45.0-49.9, adult (Multi)    Overview     Lost 8 pounds since last month and  Has lost 63 pounds with Mediterranean diet and Ozempic  Approved for Ozempic 3/26/2024         Warfarin anticoagulation     Other Visit Diagnoses       Hypertension, essential, benign        Hyperlipidemia, mixed            Continue all current  meds.  RTC in one month for recheck, sooner should problems arise.  Medication list reconciled.  Thank you for visiting today!      Professional services: Some of this note was completed using Dragon voice technology and sometimes the software misinterprets words. This may include unintended errors with respect to translation of words, typographical errors or grammar errors which may not have been identified prior to finalization of the chart note. Please take this into account when reading the note. Thank you.       Edis Patel DO 01/20/25 8:09 AM

## 2025-01-20 PROBLEM — E11.3313 MODERATE NONPROLIFERATIVE DIABETIC RETINOPATHY OF BOTH EYES WITH MACULAR EDEMA ASSOCIATED WITH TYPE 2 DIABETES MELLITUS: Status: RESOLVED | Noted: 2024-01-24 | Resolved: 2025-01-20

## 2025-01-20 PROBLEM — E11.22 CKD STAGE 3 SECONDARY TO DIABETES (MULTI): Status: RESOLVED | Noted: 2023-02-24 | Resolved: 2025-01-20

## 2025-01-20 PROBLEM — N18.30 CKD STAGE 3 SECONDARY TO DIABETES (MULTI): Status: RESOLVED | Noted: 2023-02-24 | Resolved: 2025-01-20

## 2025-01-24 DIAGNOSIS — E11.9 TYPE 2 DIABETES MELLITUS WITHOUT COMPLICATION, WITHOUT LONG-TERM CURRENT USE OF INSULIN (MULTI): ICD-10-CM

## 2025-01-24 RX ORDER — METFORMIN HYDROCHLORIDE 500 MG/1
1000 TABLET ORAL
Qty: 360 TABLET | Refills: 1 | Status: SHIPPED | OUTPATIENT
Start: 2025-01-24 | End: 2025-07-23

## 2025-01-24 NOTE — TELEPHONE ENCOUNTER
Pt last OV 1/10/2025  Requested Prescriptions     Pending Prescriptions Disp Refills    metFORMIN (Glucophage) 500 mg tablet [Pharmacy Med Name: metFORMIN HCl 500 MG Oral Tablet] 360 tablet 1     Sig: Take 2 tablets (1,000 mg) by mouth 2 times daily (morning and late afternoon).

## 2025-02-08 DIAGNOSIS — I10 HYPERTENSION, UNSPECIFIED TYPE: ICD-10-CM

## 2025-02-10 RX ORDER — SOTALOL HYDROCHLORIDE 120 MG/1
120 TABLET ORAL 2 TIMES DAILY
Qty: 180 TABLET | Refills: 1 | Status: SHIPPED | OUTPATIENT
Start: 2025-02-10

## 2025-02-10 RX ORDER — AMLODIPINE BESYLATE 10 MG/1
10 TABLET ORAL DAILY
Qty: 90 TABLET | Refills: 1 | Status: SHIPPED | OUTPATIENT
Start: 2025-02-10

## 2025-02-10 NOTE — TELEPHONE ENCOUNTER
Pharmacy requests prescription below    Last Office Visit: 1/10/2025   Next Office Visit: 2/14/2025     Requested Prescriptions     Pending Prescriptions Disp Refills    amLODIPine (Norvasc) 10 mg tablet [Pharmacy Med Name: amLODIPine Besylate 10 MG Oral Tablet] 90 tablet 0     Sig: TAKE 1 TABLET BY MOUTH ONCE DAILY AS DIRECTED    sotalol (Betapace) 120 mg tablet [Pharmacy Med Name: SOTALOL HCL 120MG   TAB] 180 tablet 0     Sig: Take 1 tablet by mouth twice daily

## 2025-02-14 ENCOUNTER — APPOINTMENT (OUTPATIENT)
Dept: PRIMARY CARE | Facility: CLINIC | Age: 70
End: 2025-02-14
Payer: MEDICARE

## 2025-02-14 VITALS
TEMPERATURE: 97.9 F | SYSTOLIC BLOOD PRESSURE: 134 MMHG | BODY MASS INDEX: 47.13 KG/M2 | HEART RATE: 60 BPM | DIASTOLIC BLOOD PRESSURE: 60 MMHG | WEIGHT: 266 LBS | RESPIRATION RATE: 20 BRPM

## 2025-02-14 DIAGNOSIS — E11.3313 MODERATE NONPROLIFERATIVE DIABETIC RETINOPATHY OF BOTH EYES WITH MACULAR EDEMA ASSOCIATED WITH TYPE 2 DIABETES MELLITUS: ICD-10-CM

## 2025-02-14 DIAGNOSIS — E11.9 TYPE 2 DIABETES MELLITUS WITHOUT COMPLICATION, WITH LONG-TERM CURRENT USE OF INSULIN (MULTI): Primary | ICD-10-CM

## 2025-02-14 DIAGNOSIS — Z79.4 TYPE 2 DIABETES MELLITUS WITHOUT COMPLICATION, WITH LONG-TERM CURRENT USE OF INSULIN (MULTI): Primary | ICD-10-CM

## 2025-02-14 DIAGNOSIS — E11.9 TYPE 2 DIABETES MELLITUS WITHOUT COMPLICATION, WITHOUT LONG-TERM CURRENT USE OF INSULIN (MULTI): Primary | ICD-10-CM

## 2025-02-14 DIAGNOSIS — I48.11 LONGSTANDING PERSISTENT ATRIAL FIBRILLATION (MULTI): ICD-10-CM

## 2025-02-14 LAB — POC INR: 1.9 (ref 0.9–1.1)

## 2025-02-14 PROCEDURE — 85610 PROTHROMBIN TIME: CPT | Performed by: FAMILY MEDICINE

## 2025-02-14 PROCEDURE — 99214 OFFICE O/P EST MOD 30 MIN: CPT | Performed by: FAMILY MEDICINE

## 2025-02-14 NOTE — PROGRESS NOTES
Subjective   Patient ID: Bree Elam is a 69 y.o. female who presents for Follow-up (1 month follow up for INR-) and Fall (Fell about 1 1/2 weeks ago and now there is a lump on her left knee and left ankle is still painful. ).    HPI  Patient comes in today for 1 month follow-up on her Coumadin.  Her INR today is 1.9.  She fell on the ice about a week ago and bruised her left knee and had some migration of the contusion hematoma down the left leg.  It is healing but slowly.  She is having some pain but can only use Tylenol for the pain.    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  The client is currently hospitalized with RSV pneumonia.  Patient is asymptomatic but has not had the RSV vaccine.  I recommended that she do so because of her diabetes and morbid obesity.    Her weight loss seems to have plateaued currently on the maximum dose of Ozempic.  Will discuss this with clinical pharmacy and see if possible change to Mounjaro would be possible and helpful.    Review of Systems   All other systems reviewed and are negative.      Objective   Vitals:  /60   Pulse 60   Temp 36.6 °C (97.9 °F)   Resp 20   Wt 121 kg (266 lb)   LMP  (LMP Unknown)   BMI 47.13 kg/m²     Physical Exam  Vitals reviewed.   Constitutional:       General: She is not in acute distress.     Appearance: She is well-developed. She is morbidly obese. She is not diaphoretic.   HENT:      Head: Normocephalic and atraumatic.      Right Ear: Tympanic membrane, ear canal and external ear normal.      Left Ear: Tympanic membrane, ear canal and external ear normal.      Nose: Nose normal.      Mouth/Throat:      Mouth: Mucous membranes are moist.      Pharynx: Oropharynx is clear.   Eyes:      General: No scleral icterus.     Extraocular Movements: Extraocular movements intact.      Conjunctiva/sclera: Conjunctivae normal.      Pupils: Pupils are equal, round, and reactive to light.   Neck:       Thyroid: No thyromegaly.      Vascular: No JVD.   Cardiovascular:      Rate and Rhythm: Normal rate and regular rhythm.      Heart sounds: Normal heart sounds. No murmur heard.     No friction rub. No gallop.   Pulmonary:      Effort: Pulmonary effort is normal. No respiratory distress.      Breath sounds: Normal breath sounds. No wheezing or rales.   Chest:      Chest wall: No tenderness.   Abdominal:      General: Abdomen is flat. Bowel sounds are normal. There is no distension.      Palpations: Abdomen is soft. There is no mass.      Tenderness: There is no abdominal tenderness. There is no rebound.   Musculoskeletal:         General: No swelling, tenderness or deformity. Normal range of motion.      Cervical back: Normal range of motion and neck supple.   Lymphadenopathy:      Cervical: No cervical adenopathy.   Skin:     General: Skin is warm and dry.      Coloration: Skin is not jaundiced.      Findings: No bruising or rash.   Neurological:      General: No focal deficit present.      Mental Status: She is alert and oriented to person, place, and time. Mental status is at baseline.      Cranial Nerves: No cranial nerve deficit.      Sensory: No sensory deficit.      Motor: No weakness.      Coordination: Coordination normal.      Gait: Gait normal.      Deep Tendon Reflexes: Reflexes normal.   Psychiatric:         Mood and Affect: Mood normal.         Behavior: Behavior normal.         Thought Content: Thought content normal.         Judgment: Judgment normal.         Assessment/Plan   Problem List Items Addressed This Visit       Atrial fibrillation (Multi)    Overview     Chronic Coumadin 3 mg daily  INR 1.9 today         Relevant Orders    POCT INR manually resulted (Completed)    Diabetes mellitus, type 2 (Multi) - Primary    Overview     Total weight loss of 63 pounds since 2/26/2024.    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  Continue all current meds.  RTC in one month for recheck, sooner should problems arise.  Medication list reconciled.  Thank you for visiting today!      Professional services: Some of this note was completed using Dragon voice technology and sometimes the software misinterprets words. This may include unintended errors with respect to translation of words, typographical errors or grammar errors which may not have been identified prior to finalization of the chart note. Please take this into account when reading the note. Thank you.       Edis Patel DO 02/14/25 9:40 AM

## 2025-02-24 DIAGNOSIS — I10 HYPERTENSION, UNSPECIFIED TYPE: ICD-10-CM

## 2025-02-26 RX ORDER — HYDROCHLOROTHIAZIDE 25 MG/1
25 TABLET ORAL DAILY
Qty: 90 TABLET | Refills: 1 | Status: SHIPPED | OUTPATIENT
Start: 2025-02-26 | End: 2025-08-25

## 2025-02-26 NOTE — TELEPHONE ENCOUNTER
Pt last OV 2/14/2025    Requested Prescriptions     Pending Prescriptions Disp Refills    hydroCHLOROthiazide (HYDRODiuril) 25 mg tablet [Pharmacy Med Name: hydroCHLOROthiazide 25 MG Oral Tablet] 90 tablet 1     Sig: Take 1 tablet (25 mg) by mouth once daily. as directed

## 2025-03-12 ENCOUNTER — APPOINTMENT (OUTPATIENT)
Dept: PHARMACY | Facility: HOSPITAL | Age: 70
End: 2025-03-12
Payer: MEDICARE

## 2025-03-12 DIAGNOSIS — E11.9 TYPE 2 DIABETES MELLITUS WITHOUT COMPLICATION, WITH LONG-TERM CURRENT USE OF INSULIN (MULTI): ICD-10-CM

## 2025-03-12 DIAGNOSIS — Z79.4 TYPE 2 DIABETES MELLITUS WITHOUT COMPLICATION, WITH LONG-TERM CURRENT USE OF INSULIN (MULTI): ICD-10-CM

## 2025-03-12 PROCEDURE — RXMED WILLOW AMBULATORY MEDICATION CHARGE

## 2025-03-12 RX ORDER — TIRZEPATIDE 5 MG/.5ML
5 INJECTION, SOLUTION SUBCUTANEOUS WEEKLY
Qty: 2 ML | Refills: 0 | Status: SHIPPED | OUTPATIENT
Start: 2025-03-12

## 2025-03-12 NOTE — PROGRESS NOTES
Subjective     Patient ID: Bree Elam is a 69 y.o. female who presents for diabetes management.    Diabetes  She presents for her follow-up diabetic visit. She has type 2 diabetes mellitus. Risk factors for coronary artery disease include dyslipidemia, obesity, hypertension and diabetes mellitus. Current diabetic treatment includes oral agent (dual therapy) and insulin injections. An ACE inhibitor/angiotensin II receptor blocker is not being taken.     Allergies   Allergen Reactions    Nsaids (Non-Steroidal Anti-Inflammatory Drug) Unknown     Objective     Current DM Pharmacotherapy:   -metformin 1,000 mg bid  -Ozempic 2mg weekly    SECONDARY PREVENTION  - Statin? Yes  - ACE-I/ARB? No  - Aspirin? Yes    Current monitoring regimen:   Patient is using: glucometer    SMBG Readings: 115 to 120 in the morning  Highest 170 in evening    Any episodes of hypoglycemia? No  Hypoglycemia awareness? No    Pertinent PMH Review:  - PMH of Pancreatitis: No  - PMH/FH of Medullary Thyroid Cancer: No  - PMH of Retinopathy: No  - PMH of Urinary Tract Infections: No    Lab Review  Lab Results   Component Value Date    BILITOT 0.3 02/26/2024    CALCIUM 9.5 02/26/2024    CO2 30 02/26/2024     02/26/2024    CREATININE 0.96 02/26/2024    GLUCOSE 170 (H) 02/26/2024    ALKPHOS 65 02/26/2024    K 4.3 02/26/2024    PROT 6.6 02/26/2024     02/26/2024    AST 19 02/26/2024    ALT 16 02/26/2024    BUN 21 02/26/2024    ANIONGAP 13 02/26/2024    MG 1.60 10/22/2020    ALBUMIN 4.2 02/26/2024    GFRF 70 02/24/2023     Lab Results   Component Value Date    TRIG 149 02/26/2024    CHOL 140 02/26/2024    LDLCALC 61 02/26/2024    HDL 48.8 02/26/2024     Lab Results   Component Value Date    HGBA1C 6.2 (H) 10/04/2024     The 10-year ASCVD risk score (Jo-Ann RODRIGUEZ, et al., 2019) is: 23.1%    Values used to calculate the score:      Age: 69 years      Sex: Female      Is Non- : No      Diabetic: Yes      Tobacco  smoker: No      Systolic Blood Pressure: 134 mmHg      Is BP treated: Yes      HDL Cholesterol: 48.8 mg/dL      Total Cholesterol: 140 mg/dL    Assessment/Plan     Problem List Items Addressed This Visit       Diabetes mellitus, type 2 (Multi)   Patients had no issues with Ozempic 2 mg weekly. She reports weight loss has stalled completely. Discussed switching from Ozempic to Mounjaro and patient is agreeable. Will start her on Mounjaro 5 mg weekly and will follow up in 4 weeks.    Type 2 diabetes mellitus, is at goal. <7%    START Mounjaro 5 mg weekly  Follow up: I recommend diabetes care be 4 weeks    Romana Bolden, PharmD    Continue all meds under the continuation of care with the referring provider and clinical pharmacy team

## 2025-03-14 ENCOUNTER — PHARMACY VISIT (OUTPATIENT)
Dept: PHARMACY | Facility: CLINIC | Age: 70
End: 2025-03-14
Payer: COMMERCIAL

## 2025-03-14 ENCOUNTER — APPOINTMENT (OUTPATIENT)
Dept: PRIMARY CARE | Facility: CLINIC | Age: 70
End: 2025-03-14
Payer: MEDICARE

## 2025-03-28 ENCOUNTER — APPOINTMENT (OUTPATIENT)
Dept: PRIMARY CARE | Facility: CLINIC | Age: 70
End: 2025-03-28
Payer: MEDICARE

## 2025-03-28 VITALS
TEMPERATURE: 97.1 F | OXYGEN SATURATION: 95 % | HEART RATE: 60 BPM | SYSTOLIC BLOOD PRESSURE: 142 MMHG | BODY MASS INDEX: 48.02 KG/M2 | DIASTOLIC BLOOD PRESSURE: 88 MMHG | WEIGHT: 271 LBS | RESPIRATION RATE: 20 BRPM

## 2025-03-28 DIAGNOSIS — H26.9 CATARACT OF LEFT EYE, UNSPECIFIED CATARACT TYPE: ICD-10-CM

## 2025-03-28 DIAGNOSIS — E66.01 MORBID OBESITY WITH BMI OF 45.0-49.9, ADULT (MULTI): ICD-10-CM

## 2025-03-28 DIAGNOSIS — I10 HYPERTENSION, ESSENTIAL, BENIGN: ICD-10-CM

## 2025-03-28 DIAGNOSIS — E55.9 VITAMIN D DEFICIENCY: ICD-10-CM

## 2025-03-28 DIAGNOSIS — E11.22 CKD STAGE 3 SECONDARY TO DIABETES (MULTI): ICD-10-CM

## 2025-03-28 DIAGNOSIS — I48.11 LONGSTANDING PERSISTENT ATRIAL FIBRILLATION (MULTI): ICD-10-CM

## 2025-03-28 DIAGNOSIS — N18.30 CKD STAGE 3 SECONDARY TO DIABETES (MULTI): ICD-10-CM

## 2025-03-28 DIAGNOSIS — Z00.00 ROUTINE GENERAL MEDICAL EXAMINATION AT A HEALTH CARE FACILITY: Primary | ICD-10-CM

## 2025-03-28 DIAGNOSIS — E78.2 HYPERLIPIDEMIA, MIXED: ICD-10-CM

## 2025-03-28 DIAGNOSIS — E53.8 VITAMIN B12 DEFICIENCY: ICD-10-CM

## 2025-03-28 DIAGNOSIS — G47.33 OSA ON CPAP: ICD-10-CM

## 2025-03-28 DIAGNOSIS — E11.9 TYPE 2 DIABETES MELLITUS WITHOUT COMPLICATION, WITHOUT LONG-TERM CURRENT USE OF INSULIN: ICD-10-CM

## 2025-03-28 PROBLEM — J44.9 COPD (CHRONIC OBSTRUCTIVE PULMONARY DISEASE) (MULTI): Status: RESOLVED | Noted: 2023-02-24 | Resolved: 2025-03-28

## 2025-03-28 LAB — POC INR: 2.2 (ref 0.9–1.1)

## 2025-03-28 PROCEDURE — 1170F FXNL STATUS ASSESSED: CPT | Performed by: FAMILY MEDICINE

## 2025-03-28 PROCEDURE — 1159F MED LIST DOCD IN RCRD: CPT | Performed by: FAMILY MEDICINE

## 2025-03-28 PROCEDURE — 85610 PROTHROMBIN TIME: CPT | Performed by: FAMILY MEDICINE

## 2025-03-28 PROCEDURE — 3079F DIAST BP 80-89 MM HG: CPT | Performed by: FAMILY MEDICINE

## 2025-03-28 PROCEDURE — 99397 PER PM REEVAL EST PAT 65+ YR: CPT | Performed by: FAMILY MEDICINE

## 2025-03-28 PROCEDURE — 3077F SYST BP >= 140 MM HG: CPT | Performed by: FAMILY MEDICINE

## 2025-03-28 PROCEDURE — 1123F ACP DISCUSS/DSCN MKR DOCD: CPT | Performed by: FAMILY MEDICINE

## 2025-03-28 PROCEDURE — G0439 PPPS, SUBSEQ VISIT: HCPCS | Performed by: FAMILY MEDICINE

## 2025-03-28 PROCEDURE — 1036F TOBACCO NON-USER: CPT | Performed by: FAMILY MEDICINE

## 2025-03-28 ASSESSMENT — ACTIVITIES OF DAILY LIVING (ADL)
DOING_HOUSEWORK: INDEPENDENT
GROCERY_SHOPPING: INDEPENDENT
MANAGING_FINANCES: INDEPENDENT
BATHING: INDEPENDENT
TAKING_MEDICATION: INDEPENDENT
DRESSING: INDEPENDENT

## 2025-03-28 ASSESSMENT — PATIENT HEALTH QUESTIONNAIRE - PHQ9
SUM OF ALL RESPONSES TO PHQ9 QUESTIONS 1 AND 2: 0
2. FEELING DOWN, DEPRESSED OR HOPELESS: NOT AT ALL
1. LITTLE INTEREST OR PLEASURE IN DOING THINGS: NOT AT ALL

## 2025-03-28 ASSESSMENT — ENCOUNTER SYMPTOMS
OCCASIONAL FEELINGS OF UNSTEADINESS: 0
DEPRESSION: 0
LOSS OF SENSATION IN FEET: 0

## 2025-03-28 NOTE — PROGRESS NOTES
Subjective   Reason for Visit: Bree Elam is an 69 y.o. female here for a Medicare Wellness visit.     Past Medical, Surgical, and Family History reviewed and updated in chart.    Reviewed all medications by prescribing practitioner or clinical pharmacist (such as prescriptions, OTCs, herbal therapies and supplements) and documented in the medical record.    HPI  Patient presents today for 1-month checkup, Medicare wellness exam and refill of meds.  She states that she is taking her medicines as directed and is not having any side effects from them. She currently is without complaints.    Patient had plateaued on her Ozempic medicine that she was using for her sugar and weight loss and is now on Mounjaro at 5 mg weekly.  So far she has not noticed any significant weight change with the new medication.  In fact since she was here 6 weeks ago she has gained 5 pounds.    Patient's INR today is 2.2.  Will have her continue the same Coumadin dose.    Patient is up-to-date with her mammograms, last done December 2024.    Patient is due for colonoscopy.  She will contact Dr. Flavio Rowan.      Patient's PHQ-9 score today was 2 which she describes is not difficult at all    Patient Care Team:  Edis Patel DO as PCP - General  Edis Patel DO as PCP - [employer] PCP     Review of Systems   All other systems reviewed and are negative.    Objective   Vitals:  /88   Pulse 60   Temp 36.2 °C (97.1 °F)   Resp 20   Wt 123 kg (271 lb)   LMP  (LMP Unknown)   SpO2 95%   BMI 48.02 kg/m²       Physical Exam  Vitals reviewed.   Constitutional:       General: She is not in acute distress.     Appearance: She is well-developed. She is morbidly obese. She is not diaphoretic.   HENT:      Head: Normocephalic and atraumatic.      Right Ear: Tympanic membrane, ear canal and external ear normal.      Left Ear: Tympanic membrane, ear canal and external ear normal.      Nose: Nose normal.       Mouth/Throat:      Mouth: Mucous membranes are moist.      Pharynx: Oropharynx is clear.   Eyes:      General: No scleral icterus.     Extraocular Movements: Extraocular movements intact.      Conjunctiva/sclera: Conjunctivae normal.      Pupils: Pupils are equal, round, and reactive to light.   Neck:      Thyroid: No thyromegaly.      Vascular: No JVD.   Cardiovascular:      Rate and Rhythm: Normal rate and regular rhythm.      Heart sounds: Normal heart sounds. No murmur heard.     No friction rub. No gallop.   Pulmonary:      Effort: Pulmonary effort is normal. No respiratory distress.      Breath sounds: Normal breath sounds. No wheezing or rales.   Chest:      Chest wall: No tenderness.   Abdominal:      General: Abdomen is flat. Bowel sounds are normal. There is no distension.      Palpations: Abdomen is soft. There is no mass.      Tenderness: There is no abdominal tenderness. There is no rebound.   Musculoskeletal:         General: No swelling, tenderness or deformity. Normal range of motion.      Cervical back: Normal range of motion and neck supple.   Lymphadenopathy:      Cervical: No cervical adenopathy.   Skin:     General: Skin is warm and dry.      Coloration: Skin is not jaundiced.      Findings: No bruising or rash.   Neurological:      General: No focal deficit present.      Mental Status: She is alert and oriented to person, place, and time. Mental status is at baseline.      Cranial Nerves: No cranial nerve deficit.      Sensory: No sensory deficit.      Motor: No weakness.      Coordination: Coordination normal.      Gait: Gait normal.      Deep Tendon Reflexes: Reflexes normal.   Psychiatric:         Mood and Affect: Mood normal.         Behavior: Behavior normal.         Thought Content: Thought content normal.         Judgment: Judgment normal.     Assessment & Plan  Longstanding persistent atrial fibrillation (Multi)    Orders:    POCT INR manually resulted    Type 2 diabetes mellitus  without complication, without long-term current use of insulin    Orders:    Hemoglobin A1C; Future    Referral to Ophthalmology; Future    Vitamin D deficiency    Orders:    Vitamin D 25-Hydroxy,Total (for eval of Vitamin D levels); Future    Vitamin B12 deficiency    Orders:    CBC; Future    Vitamin B12; Future    Hyperlipidemia, mixed    Orders:    Lipid Panel; Future    Hypertension, essential, benign    Orders:    Comprehensive Metabolic Panel; Future    Referral to Ophthalmology; Future    Cataract of left eye, unspecified cataract type    Orders:    Referral to Ophthalmology; Future    Routine general medical examination at a health care facility         Morbid obesity with BMI of 45.0-49.9, adult (Multi)         CKD stage 3 secondary to diabetes (Multi)         NYDIA on CPAP         Will contact patient with lab results when available.  Continue current Coumadin meds.  RTC in one month for recheck, sooner should problems arise.  Medication list reconciled.  Thank you for visiting today!      Professional services: Some of this note was completed using Dragon voice technology and sometimes the software misinterprets words. This may include unintended errors with respect to translation of words, typographical errors or grammar errors which may not have been identified prior to finalization of the chart note. Please take this into account when reading the note. Thank you.

## 2025-03-29 DIAGNOSIS — K21.9 GASTROESOPHAGEAL REFLUX DISEASE WITHOUT ESOPHAGITIS: ICD-10-CM

## 2025-03-31 RX ORDER — PANTOPRAZOLE SODIUM 40 MG/1
TABLET, DELAYED RELEASE ORAL
Qty: 90 TABLET | Refills: 1 | Status: SHIPPED | OUTPATIENT
Start: 2025-03-31

## 2025-03-31 NOTE — TELEPHONE ENCOUNTER
Pt last OV 3/28/2025    Requested Prescriptions     Pending Prescriptions Disp Refills    pantoprazole (ProtoNix) 40 mg EC tablet [Pharmacy Med Name: Pantoprazole Sodium 40 MG Oral Tablet Delayed Release] 90 tablet 1     Sig: TAKE 1 TABLET BY MOUTH ONCE DAILY IN THE MORNING -  TAKE  BEFORE  MEALS.  DO  NOT  CRUSH,  CHEW,  OR  SPLIT

## 2025-04-09 ENCOUNTER — TELEPHONE (OUTPATIENT)
Dept: PRIMARY CARE | Facility: CLINIC | Age: 70
End: 2025-04-09

## 2025-04-09 ENCOUNTER — APPOINTMENT (OUTPATIENT)
Dept: PHARMACY | Facility: HOSPITAL | Age: 70
End: 2025-04-09
Payer: MEDICARE

## 2025-04-09 DIAGNOSIS — Z79.4 TYPE 2 DIABETES MELLITUS WITHOUT COMPLICATION, WITH LONG-TERM CURRENT USE OF INSULIN: ICD-10-CM

## 2025-04-09 DIAGNOSIS — E11.9 TYPE 2 DIABETES MELLITUS WITHOUT COMPLICATION, WITH LONG-TERM CURRENT USE OF INSULIN: ICD-10-CM

## 2025-04-09 LAB
25(OH)D3+25(OH)D2 SERPL-MCNC: 60 NG/ML (ref 30–100)
ALBUMIN SERPL-MCNC: 4.4 G/DL (ref 3.6–5.1)
ALP SERPL-CCNC: 47 U/L (ref 37–153)
ALT SERPL-CCNC: 17 U/L (ref 6–29)
ANION GAP SERPL CALCULATED.4IONS-SCNC: 11 MMOL/L (CALC) (ref 7–17)
AST SERPL-CCNC: 19 U/L (ref 10–35)
BILIRUB SERPL-MCNC: 0.4 MG/DL (ref 0.2–1.2)
BUN SERPL-MCNC: 16 MG/DL (ref 7–25)
CALCIUM SERPL-MCNC: 9.3 MG/DL (ref 8.6–10.4)
CHLORIDE SERPL-SCNC: 106 MMOL/L (ref 98–110)
CHOLEST SERPL-MCNC: 124 MG/DL
CHOLEST/HDLC SERPL: 2.8 (CALC)
CO2 SERPL-SCNC: 28 MMOL/L (ref 20–32)
CREAT SERPL-MCNC: 0.85 MG/DL (ref 0.6–1)
EGFRCR SERPLBLD CKD-EPI 2021: 74 ML/MIN/1.73M2
ERYTHROCYTE [DISTWIDTH] IN BLOOD BY AUTOMATED COUNT: 13.9 % (ref 11–15)
EST. AVERAGE GLUCOSE BLD GHB EST-MCNC: 126 MG/DL
EST. AVERAGE GLUCOSE BLD GHB EST-SCNC: 7 MMOL/L
GLUCOSE SERPL-MCNC: 134 MG/DL (ref 65–99)
HBA1C MFR BLD: 6 % OF TOTAL HGB
HCT VFR BLD AUTO: 34.5 % (ref 35–45)
HDLC SERPL-MCNC: 45 MG/DL
HGB BLD-MCNC: 10.9 G/DL (ref 11.7–15.5)
LDLC SERPL CALC-MCNC: 54 MG/DL (CALC)
MCH RBC QN AUTO: 29.4 PG (ref 27–33)
MCHC RBC AUTO-ENTMCNC: 31.6 G/DL (ref 32–36)
MCV RBC AUTO: 93 FL (ref 80–100)
NONHDLC SERPL-MCNC: 79 MG/DL (CALC)
PLATELET # BLD AUTO: 196 THOUSAND/UL (ref 140–400)
PMV BLD REES-ECKER: 10.8 FL (ref 7.5–12.5)
POTASSIUM SERPL-SCNC: 3.8 MMOL/L (ref 3.5–5.3)
PROT SERPL-MCNC: 6.2 G/DL (ref 6.1–8.1)
RBC # BLD AUTO: 3.71 MILLION/UL (ref 3.8–5.1)
SODIUM SERPL-SCNC: 145 MMOL/L (ref 135–146)
TRIGL SERPL-MCNC: 168 MG/DL
VIT B12 SERPL-MCNC: 607 PG/ML (ref 200–1100)
WBC # BLD AUTO: 4.6 THOUSAND/UL (ref 3.8–10.8)

## 2025-04-09 NOTE — TELEPHONE ENCOUNTER
Patient called in asking if the doctor had any recommendations for a dentist?  Patient can be reached at [phone].      Thank You

## 2025-04-11 ENCOUNTER — TELEPHONE (OUTPATIENT)
Dept: PRIMARY CARE | Facility: CLINIC | Age: 70
End: 2025-04-11

## 2025-04-11 ENCOUNTER — APPOINTMENT (OUTPATIENT)
Dept: PRIMARY CARE | Facility: CLINIC | Age: 70
End: 2025-04-11
Payer: MEDICARE

## 2025-04-11 DIAGNOSIS — M25.562 ACUTE PAIN OF LEFT KNEE: ICD-10-CM

## 2025-04-11 DIAGNOSIS — I48.11 LONGSTANDING PERSISTENT ATRIAL FIBRILLATION (MULTI): Primary | ICD-10-CM

## 2025-04-11 DIAGNOSIS — E66.01 MORBID OBESITY WITH BMI OF 45.0-49.9, ADULT (MULTI): ICD-10-CM

## 2025-04-11 DIAGNOSIS — K08.89 TOOTH PAIN: ICD-10-CM

## 2025-04-11 DIAGNOSIS — S05.12XA CONTUSION OF LEFT ORBIT, INITIAL ENCOUNTER: ICD-10-CM

## 2025-04-11 DIAGNOSIS — R51.9 FACIAL PAIN: ICD-10-CM

## 2025-04-11 DIAGNOSIS — M54.2 NECK PAIN ON LEFT SIDE: ICD-10-CM

## 2025-04-11 LAB — POC INR: 2.2 (ref 0.9–1.1)

## 2025-04-11 PROCEDURE — 85610 PROTHROMBIN TIME: CPT | Performed by: FAMILY MEDICINE

## 2025-04-11 PROCEDURE — 99214 OFFICE O/P EST MOD 30 MIN: CPT | Performed by: FAMILY MEDICINE

## 2025-04-11 RX ORDER — TRAMADOL HYDROCHLORIDE 50 MG/1
50 TABLET ORAL EVERY 6 HOURS PRN
Qty: 24 TABLET | Refills: 0 | Status: SHIPPED | OUTPATIENT
Start: 2025-04-11 | End: 2025-04-17

## 2025-04-11 ASSESSMENT — ENCOUNTER SYMPTOMS
OCCASIONAL FEELINGS OF UNSTEADINESS: 0
DEPRESSION: 0
LOSS OF SENSATION IN FEET: 0

## 2025-04-11 NOTE — TELEPHONE ENCOUNTER
Patient called on this morning stating that the Tramadol worked for like two hours and she is in pain again.  Patient is asking if she can take extra strength tylenol in between the time she can take her tramadol?  Patient is can be reached at [phone].        Thank You

## 2025-04-11 NOTE — PROGRESS NOTES
Subjective   Patient ID: Bree Elam is a 69 y.o. female who presents for Fall (Took her two little dogs for a walk and was doing the last step. She does not kn ow if she tripped over the leashes, the next thing she knew she was on the ground. She laid there for a moment and yelled for her son. He got a chair for her to stand. She hit her left knee and hit her head and thinks she cracked one of her teeth.cannot get in to see a dentist until Monday. Cannot eat or sleep. Tylenol only helping a bit.) and Follow-up (INR-2.2).    HPI  Patient presents today for 1-month checkup and refill of meds.  She states that she is taking her medicines as directed and is not having any side effects from them.     She currently is with complaints as noted above.  She had gone outside earlier this week to walk her dogs and is not sure what happened.  She states she cut down to the last step and the next thing she knew she was on the ground.  She sustained a significant hematoma in the left eyebrow above the left eye laterally which has migrated downward and now to beneath her left eye.  She complains of pain along the lateral side of her left neck to palpation and also feels like she may have cracked a lower rear molar on the left.  She can turn her head to the left and right without pain and she is able to also extend and flex the neck without pain.    Review of Systems   All other systems reviewed and are negative.    Objective   Vitals:  LMP  (LMP Unknown)     Physical Exam  Vitals reviewed.   Constitutional:       General: She is not in acute distress.     Appearance: She is well-developed. She is morbidly obese. She is not diaphoretic.   HENT:      Head: Normocephalic and atraumatic.      Right Ear: Tympanic membrane, ear canal and external ear normal.      Left Ear: Tympanic membrane, ear canal and external ear normal.      Nose: Nose normal.      Mouth/Throat:      Mouth: Mucous membranes are moist.      Pharynx:  Oropharynx is clear.      Comments: Soreness of the left lower rear molar.  Eyes:      General: No scleral icterus.     Extraocular Movements: Extraocular movements intact.      Conjunctiva/sclera: Conjunctivae normal.      Pupils: Pupils are equal, round, and reactive to light.      Comments: Swelling of the left eyebrow and ecchymosis of the upper left lid and in the maxillary area beneath the left eye.   Neck:      Thyroid: No thyromegaly.      Vascular: No JVD.   Cardiovascular:      Rate and Rhythm: Normal rate and regular rhythm.      Heart sounds: Normal heart sounds. No murmur heard.     No friction rub. No gallop.   Pulmonary:      Effort: Pulmonary effort is normal. No respiratory distress.      Breath sounds: Normal breath sounds. No wheezing or rales.   Chest:      Chest wall: No tenderness.   Abdominal:      General: Abdomen is flat. Bowel sounds are normal. There is no distension.      Palpations: Abdomen is soft. There is no mass.      Tenderness: There is no abdominal tenderness. There is no rebound.   Musculoskeletal:         General: Swelling (Above the left eye with ecchymosis beneath the left eye.  Patient also with swelling and ecchymosis of the left knee.) present. No deformity. Normal range of motion.      Cervical back: Normal range of motion and neck supple. Tenderness (To palpation of the left side of the neck.  No visible sign of ecchymosis or swelling.) present.   Lymphadenopathy:      Cervical: No cervical adenopathy.   Skin:     General: Skin is warm and dry.      Coloration: Skin is not jaundiced.      Findings: No bruising or rash.   Neurological:      General: No focal deficit present.      Mental Status: She is alert and oriented to person, place, and time. Mental status is at baseline.      Cranial Nerves: No cranial nerve deficit.      Sensory: No sensory deficit.      Motor: No weakness.      Coordination: Coordination normal.      Gait: Gait normal.      Deep Tendon Reflexes:  Reflexes normal.   Psychiatric:         Mood and Affect: Mood normal.         Behavior: Behavior normal.         Thought Content: Thought content normal.         Judgment: Judgment normal.       CBC -  Recent Labs     04/08/25 0757 02/26/24  1000 07/19/23  0903   WBC 4.6 6.5 5.6   HGB 10.9* 11.4* 11.0*   HCT 34.5* 36.2 35.1*    275 197   MCV 93.0 92 93       CMP -  Recent Labs     04/08/25 0757 02/26/24  1000 02/24/23  0944 05/10/21  0955 10/22/20  1000 06/24/20  0824 05/29/20  1013 06/04/19  0945    142 139   < >  --  143   < > 142   K 3.8 4.3 4.1   < >  --  4.0   < > 4.2    103 102   < >  --  105   < > 104   CO2 28 30 27   < >  --  31   < > 29   ANIONGAP 11 13 14   < >  --  11   < > 13   BUN 16 21 17   < >  --  17   < > 19   CREATININE 0.85 0.96 0.89   < >  --  0.82   < > 0.88   EGFR 74 64  --   --   --   --   --   --    MG  --   --   --   --  1.60 1.59*  --  1.79    < > = values in this interval not displayed.     Recent Labs     04/08/25 0757 02/26/24  1000 02/24/23  0944   ALBUMIN 4.4 4.2 4.3   ALKPHOS 47 65 64   ALT 17 16 19   AST 19 19 23   BILITOT 0.4 0.3 0.4       LIPID PANEL -   Recent Labs     04/08/25 0757 02/26/24  1000 02/24/23  0944 06/23/22  0847 01/11/22  0855   CHOL 124 140 153 134 138   LDLCALC 54 61  --   --   --    LDLF  --   --  66 55 52   HDL 45* 48.8 50.3 47.0 44.0   TRIG 168* 149 186* 159* 209*       Recent Labs     04/08/25  0757 10/04/24  0910 07/02/24  1451   HGBA1C 6.0* 6.2* 6.1*       Lab Results   Component Value Date    IZNMXFGX03 607 04/08/2025       Lab Results   Component Value Date    VITD25 60 04/08/2025        Assessment/Plan   Problem List Items Addressed This Visit       Atrial fibrillation (Multi) - Primary    Overview     Chronic Coumadin 3 mg daily  INR 2.2 today         Relevant Orders    POCT INR manually resulted (Completed)    Morbid obesity with BMI of 45.0-49.9, adult (Multi)    Overview     Has gained 5 pounds since last month and  Had lost 63  pounds with Mediterranean diet and Ozempic  Approved for Ozempic 3/26/2024  Switch to Mounjaro 3/12/2025 wt 271         Neck pain on left side    Tooth pain    Contusion of left orbit    Facial pain    Relevant Medications    traMADol (Ultram) 50 mg tablet    Acute pain of left knee   Lab results reviewed with patient.  Tramadol added for pain  Follow-up with dentist on Monday as scheduled  Continue current Coumadin meds.  RTC in one month for recheck, sooner should problems arise.  Medication list reconciled.  Thank you for visiting today!      Professional services: Some of this note was completed using Dragon voice technology and sometimes the software misinterprets words. This may include unintended errors with respect to translation of words, typographical errors or grammar errors which may not have been identified prior to finalization of the chart note. Please take this into account when reading the note. Thank you.       Edis Patel DO 04/11/25 9:37 AM

## 2025-04-14 PROCEDURE — RXMED WILLOW AMBULATORY MEDICATION CHARGE

## 2025-04-14 RX ORDER — TIRZEPATIDE 7.5 MG/.5ML
7.5 INJECTION, SOLUTION SUBCUTANEOUS WEEKLY
Qty: 2 ML | Refills: 0 | Status: SHIPPED | OUTPATIENT
Start: 2025-04-14

## 2025-04-14 NOTE — PROGRESS NOTES
Subjective     Patient ID: Bree Elam is a 69 y.o. female who presents for diabetes management.    Diabetes  She presents for her follow-up diabetic visit. She has type 2 diabetes mellitus. Risk factors for coronary artery disease include dyslipidemia, obesity, hypertension and diabetes mellitus. Current diabetic treatment includes oral agent (dual therapy) and insulin injections. An ACE inhibitor/angiotensin II receptor blocker is not being taken.     Allergies   Allergen Reactions    Nsaids (Non-Steroidal Anti-Inflammatory Drug) Unknown     Objective     Current DM Pharmacotherapy:   -metformin 1,000 mg bid  -Mounjaro 5 mg weekly    SECONDARY PREVENTION  - Statin? Yes  - ACE-I/ARB? No  - Aspirin? Yes    Current monitoring regimen:   Patient is using: glucometer    SMBG Readings: 115 to 120 in the morning  Highest 170 in evening    Any episodes of hypoglycemia? No  Hypoglycemia awareness? No    Pertinent PMH Review:  - PMH of Pancreatitis: No  - PMH/FH of Medullary Thyroid Cancer: No  - PMH of Retinopathy: No  - PMH of Urinary Tract Infections: No    Lab Review  Lab Results   Component Value Date    BILITOT 0.4 04/08/2025    CALCIUM 9.3 04/08/2025    CO2 28 04/08/2025     04/08/2025    CREATININE 0.85 04/08/2025    GLUCOSE 134 (H) 04/08/2025    ALKPHOS 47 04/08/2025    K 3.8 04/08/2025    PROT 6.2 04/08/2025     04/08/2025    AST 19 04/08/2025    ALT 17 04/08/2025    BUN 16 04/08/2025    ANIONGAP 11 04/08/2025    MG 1.60 10/22/2020    ALBUMIN 4.4 04/08/2025    GFRF 70 02/24/2023     Lab Results   Component Value Date    TRIG 168 (H) 04/08/2025    CHOL 124 04/08/2025    LDLCALC 54 04/08/2025    HDL 45 (L) 04/08/2025     Lab Results   Component Value Date    HGBA1C 6.0 (H) 04/08/2025     The ASCVD Risk score (Jo-Ann RODRIGUEZ, et al., 2019) failed to calculate for the following reasons:    The valid total cholesterol range is 130 to 320 mg/dL    Assessment/Plan     Problem List Items Addressed This  Visit       Diabetes mellitus, type 2 (Multi)   Patients had no issues with switching from Ozempic to Mounjaro. Will increase her to Mounjaro 7.5 mg weekly and will follow up in 4 weeks.    Type 2 diabetes mellitus, is at goal. <7%    INCREASE Mounjaro to 7.5 mg weekly  Follow up: I recommend diabetes care be 4 weeks    Romana Bolden, PharmD    Continue all meds under the continuation of care with the referring provider and clinical pharmacy team

## 2025-04-16 ENCOUNTER — PHARMACY VISIT (OUTPATIENT)
Dept: PHARMACY | Facility: CLINIC | Age: 70
End: 2025-04-16
Payer: COMMERCIAL

## 2025-04-17 ENCOUNTER — TELEPHONE (OUTPATIENT)
Dept: PRIMARY CARE | Facility: CLINIC | Age: 70
End: 2025-04-17
Payer: MEDICARE

## 2025-04-17 NOTE — TELEPHONE ENCOUNTER
Patient called back stating that she is sorry she hit the wrong button and disconnected the phone call.  Patient can be reached at [phone].        Thank You

## 2025-04-17 NOTE — TELEPHONE ENCOUNTER
Patient called to let the doctor she was in the ER again last night.  Patient was in [facility] and was discharged she wanted to let the doctor know in case he had any questions for her.  Patient can be reached at [phone].        Thank You

## 2025-04-18 NOTE — TELEPHONE ENCOUNTER
I spoke to the patient.  She had to go back to the ER on 2 occasions, 1 because of severe tooth pain.  She now has an appointment for follow-up with a dentist to have her teeth removed.    She also had to go back because she had severe constipation/impaction and had to manually remove stool and wind up having blood coming from the rectum afterward.  She went to the ER and was given medicines and claims that everything is moving well now.    She does not yet have an appointment for the dentist and I recommend that she get 1 as soon as possible to find out the game plan as she will likely need to be off the Coumadin for several days before she has the teeth pulled due to bleeding.

## 2025-04-24 ENCOUNTER — APPOINTMENT (OUTPATIENT)
Dept: OPHTHALMOLOGY | Facility: CLINIC | Age: 70
End: 2025-04-24
Payer: MEDICARE

## 2025-04-30 ENCOUNTER — APPOINTMENT (OUTPATIENT)
Dept: PHARMACY | Facility: HOSPITAL | Age: 70
End: 2025-04-30
Payer: MEDICARE

## 2025-04-30 DIAGNOSIS — E11.9 TYPE 2 DIABETES MELLITUS WITHOUT COMPLICATION, WITH LONG-TERM CURRENT USE OF INSULIN: ICD-10-CM

## 2025-04-30 DIAGNOSIS — Z79.4 TYPE 2 DIABETES MELLITUS WITHOUT COMPLICATION, WITH LONG-TERM CURRENT USE OF INSULIN: ICD-10-CM

## 2025-05-02 RX ORDER — TIRZEPATIDE 7.5 MG/.5ML
7.5 INJECTION, SOLUTION SUBCUTANEOUS WEEKLY
Qty: 2 ML | Refills: 0 | Status: SHIPPED | OUTPATIENT
Start: 2025-05-02

## 2025-05-02 NOTE — PROGRESS NOTES
Subjective     Patient ID: Bree Elam is a 69 y.o. female who presents for diabetes management.    Diabetes  She presents for her follow-up diabetic visit. She has type 2 diabetes mellitus. Risk factors for coronary artery disease include dyslipidemia, obesity, hypertension and diabetes mellitus. Current diabetic treatment includes oral agent (dual therapy) and insulin injections. An ACE inhibitor/angiotensin II receptor blocker is not being taken.     Allergies   Allergen Reactions    Nsaids (Non-Steroidal Anti-Inflammatory Drug) Unknown     Objective     Current DM Pharmacotherapy:   -metformin 1,000 mg bid  -Mounjaro 7.5 mg weekly    SECONDARY PREVENTION  - Statin? Yes  - ACE-I/ARB? No  - Aspirin? Yes    Current monitoring regimen:   Patient is using: glucometer    SMBG Readings: 115 to 120 in the morning  Highest 170 in evening    Any episodes of hypoglycemia? No  Hypoglycemia awareness? No    Pertinent PMH Review:  - PMH of Pancreatitis: No  - PMH/FH of Medullary Thyroid Cancer: No  - PMH of Retinopathy: No  - PMH of Urinary Tract Infections: No    Lab Review  Lab Results   Component Value Date    BILITOT 0.4 04/08/2025    CALCIUM 9.3 04/08/2025    CO2 28 04/08/2025     04/08/2025    CREATININE 0.85 04/08/2025    GLUCOSE 134 (H) 04/08/2025    ALKPHOS 47 04/08/2025    K 3.8 04/08/2025    PROT 6.2 04/08/2025     04/08/2025    AST 19 04/08/2025    ALT 17 04/08/2025    BUN 16 04/08/2025    ANIONGAP 11 04/08/2025    MG 1.60 10/22/2020    ALBUMIN 4.4 04/08/2025    GFRF 70 02/24/2023     Lab Results   Component Value Date    TRIG 168 (H) 04/08/2025    CHOL 124 04/08/2025    LDLCALC 54 04/08/2025    HDL 45 (L) 04/08/2025     Lab Results   Component Value Date    HGBA1C 6.0 (H) 04/08/2025     The ASCVD Risk score (Jo-Ann RODRIGUEZ, et al., 2019) failed to calculate for the following reasons:    The valid total cholesterol range is 130 to 320 mg/dL    Assessment/Plan     Problem List Items Addressed This  Visit       Diabetes mellitus, type 2 (Multi)   Patients had no issues with increased dose of Mounjaro. Weight loss is down to 255 lbs now! Will continue Mounjaro at current dose and will decrease metformin to 1 tablet bid. Will follow up in 4 weeks.    Type 2 diabetes mellitus, is at goal. <7%    CONTINUE Mounjaro 7.5 mg weekly  Follow up: I recommend diabetes care be 4 weeks    Romana Bolden, PharmD    Continue all meds under the continuation of care with the referring provider and clinical pharmacy team

## 2025-05-05 DIAGNOSIS — E11.9 TYPE 2 DIABETES MELLITUS WITHOUT COMPLICATION, WITHOUT LONG-TERM CURRENT USE OF INSULIN: ICD-10-CM

## 2025-05-05 RX ORDER — BLOOD SUGAR DIAGNOSTIC
STRIP MISCELLANEOUS
Qty: 200 EACH | Refills: 3 | Status: SHIPPED | OUTPATIENT
Start: 2025-05-05

## 2025-05-05 NOTE — TELEPHONE ENCOUNTER
Pt last OV 4/11/2025    Requested Prescriptions     Pending Prescriptions Disp Refills    blood sugar diagnostic (OneTouch Ultra Test) [Pharmacy Med Name: OneTouch Ultra Blue In Vitro Strip] 200 each 3     Sig: USE 1 STRIP TO CHECK GLUCOSE THREE TIMES DAILY

## 2025-05-07 DIAGNOSIS — I48.91 ATRIAL FIBRILLATION, UNSPECIFIED TYPE (MULTI): ICD-10-CM

## 2025-05-07 RX ORDER — WARFARIN 3 MG/1
3 TABLET ORAL EVERY EVENING
Qty: 90 TABLET | Refills: 1 | Status: SHIPPED | OUTPATIENT
Start: 2025-05-07

## 2025-05-07 NOTE — TELEPHONE ENCOUNTER
Pt last OV 4/11/2025    Requested Prescriptions     Pending Prescriptions Disp Refills    warfarin (Coumadin) 3 mg tablet [Pharmacy Med Name: Warfarin Sodium 3 MG Oral Tablet] 90 tablet      Sig: TAKE 1 TABLET BY MOUTH ONCE DAILY IN THE EVENING

## 2025-05-09 PROCEDURE — RXMED WILLOW AMBULATORY MEDICATION CHARGE

## 2025-05-13 ENCOUNTER — TELEPHONE (OUTPATIENT)
Dept: PRIMARY CARE | Facility: CLINIC | Age: 70
End: 2025-05-13
Payer: MEDICARE

## 2025-05-13 NOTE — TELEPHONE ENCOUNTER
Patient called in this morning stating that she still does not have her Mounjaro from the pharmacy.  Patient states that she is supposed to take her next shot on Thursday.  Patient can be reached at [phone].        Thank You

## 2025-05-14 ENCOUNTER — PHARMACY VISIT (OUTPATIENT)
Dept: PHARMACY | Facility: CLINIC | Age: 70
End: 2025-05-14
Payer: COMMERCIAL

## 2025-05-14 NOTE — TELEPHONE ENCOUNTER
Patient called in this afternoon stating that she has not heard anything from the company about delivering her medication.  Patient can be reached at [phone].        Thank You

## 2025-05-16 ENCOUNTER — APPOINTMENT (OUTPATIENT)
Dept: PRIMARY CARE | Facility: CLINIC | Age: 70
End: 2025-05-16
Payer: MEDICARE

## 2025-05-28 ENCOUNTER — APPOINTMENT (OUTPATIENT)
Dept: PRIMARY CARE | Facility: CLINIC | Age: 70
End: 2025-05-28
Payer: MEDICARE

## 2025-05-28 ENCOUNTER — APPOINTMENT (OUTPATIENT)
Dept: PHARMACY | Facility: HOSPITAL | Age: 70
End: 2025-05-28
Payer: MEDICARE

## 2025-05-28 VITALS
TEMPERATURE: 97.3 F | WEIGHT: 265.5 LBS | DIASTOLIC BLOOD PRESSURE: 56 MMHG | HEART RATE: 60 BPM | SYSTOLIC BLOOD PRESSURE: 132 MMHG | RESPIRATION RATE: 18 BRPM | BODY MASS INDEX: 47.04 KG/M2

## 2025-05-28 DIAGNOSIS — Z79.4 TYPE 2 DIABETES MELLITUS WITHOUT COMPLICATION, WITH LONG-TERM CURRENT USE OF INSULIN: ICD-10-CM

## 2025-05-28 DIAGNOSIS — E66.01 MORBID OBESITY WITH BMI OF 45.0-49.9, ADULT (MULTI): ICD-10-CM

## 2025-05-28 DIAGNOSIS — E11.9 TYPE 2 DIABETES MELLITUS WITHOUT COMPLICATION, WITH LONG-TERM CURRENT USE OF INSULIN: ICD-10-CM

## 2025-05-28 DIAGNOSIS — I10 HYPERTENSION, ESSENTIAL, BENIGN: ICD-10-CM

## 2025-05-28 DIAGNOSIS — I48.11 LONGSTANDING PERSISTENT ATRIAL FIBRILLATION (MULTI): Primary | ICD-10-CM

## 2025-05-28 LAB — POC INR: 1.7 (ref 0.9–1.1)

## 2025-05-28 PROCEDURE — 85610 PROTHROMBIN TIME: CPT | Performed by: FAMILY MEDICINE

## 2025-05-28 PROCEDURE — 99213 OFFICE O/P EST LOW 20 MIN: CPT | Performed by: FAMILY MEDICINE

## 2025-05-28 RX ORDER — TIRZEPATIDE 10 MG/.5ML
10 INJECTION, SOLUTION SUBCUTANEOUS WEEKLY
Qty: 2 ML | Refills: 0 | Status: SHIPPED | OUTPATIENT
Start: 2025-05-28

## 2025-05-28 NOTE — PROGRESS NOTES
Subjective     Patient ID: Bree Elam is a 69 y.o. female who presents for diabetes management.    Diabetes  She presents for her follow-up diabetic visit. She has type 2 diabetes mellitus. Risk factors for coronary artery disease include dyslipidemia, obesity, hypertension and diabetes mellitus. Current diabetic treatment includes oral agent (dual therapy) and insulin injections. An ACE inhibitor/angiotensin II receptor blocker is not being taken.     Allergies   Allergen Reactions    Nsaids (Non-Steroidal Anti-Inflammatory Drug) Unknown     Objective     Current DM Pharmacotherapy:   -metformin 1,000 mg bid  -Mounjaro 7.5 mg weekly    SECONDARY PREVENTION  - Statin? Yes  - ACE-I/ARB? No  - Aspirin? Yes    Current monitoring regimen:   Patient is using: glucometer    SMBG Readings: 115 to 120 in the morning  Highest 170 in evening    Any episodes of hypoglycemia? No  Hypoglycemia awareness? No    Pertinent PMH Review:  - PMH of Pancreatitis: No  - PMH/FH of Medullary Thyroid Cancer: No  - PMH of Retinopathy: No  - PMH of Urinary Tract Infections: No    Lab Review  Lab Results   Component Value Date    BILITOT 0.4 04/08/2025    CALCIUM 9.3 04/08/2025    CO2 28 04/08/2025     04/08/2025    CREATININE 0.85 04/08/2025    GLUCOSE 134 (H) 04/08/2025    ALKPHOS 47 04/08/2025    K 3.8 04/08/2025    PROT 6.2 04/08/2025     04/08/2025    AST 19 04/08/2025    ALT 17 04/08/2025    BUN 16 04/08/2025    ANIONGAP 11 04/08/2025    MG 1.60 10/22/2020    ALBUMIN 4.4 04/08/2025    GFRF 70 02/24/2023     Lab Results   Component Value Date    TRIG 168 (H) 04/08/2025    CHOL 124 04/08/2025    LDLCALC 54 04/08/2025    HDL 45 (L) 04/08/2025     Lab Results   Component Value Date    HGBA1C 6.0 (H) 04/08/2025     The ASCVD Risk score (Jo-Ann RODRIGUEZ, et al., 2019) failed to calculate for the following reasons:    The valid total cholesterol range is 130 to 320 mg/dL    Assessment/Plan     Problem List Items Addressed This  Visit       Diabetes mellitus, type 2 (Multi)   Patients had no issues with Mounjaro. Weight loss is starting to slow now. Will increase Mounjaro to 10 mg for better weight loss potential and will follow up in 4 weeks.    Type 2 diabetes mellitus, is at goal. <7%    INCREASE Mounjaro to 10 mg weekly  Follow up: I recommend diabetes care be 4 weeks    Romana Bolden, PharmD    Continue all meds under the continuation of care with the referring provider and clinical pharmacy team

## 2025-05-28 NOTE — PROGRESS NOTES
Subjective   Patient ID: Bree Elam is a 69 y.o. female who presents for Coagulation Disorder.    HPI  Patient comes in today for checkup and monitoring of her medication. She has no voiced complaints and no evidence of bleeding or other problems from the Coumadin.  Her INR level today is 1.7.    Patient continues to lose weight but more slowly.  Will have her increase the Mounjaro to 10 mg weekly.      Review of Systems   All other systems reviewed and are negative.      Objective   Vitals:  /56   Pulse 60   Temp 36.3 °C (97.3 °F)   Resp 18   Wt 120 kg (265 lb 8 oz)   LMP  (LMP Unknown)   BMI 47.04 kg/m²     Physical Exam  Vitals reviewed.   Constitutional:       General: She is not in acute distress.     Appearance: She is well-developed. She is morbidly obese. She is not diaphoretic.   HENT:      Head: Normocephalic and atraumatic.      Right Ear: Tympanic membrane, ear canal and external ear normal.      Left Ear: Tympanic membrane, ear canal and external ear normal.      Nose: Nose normal.      Mouth/Throat:      Mouth: Mucous membranes are moist.      Pharynx: Oropharynx is clear.      Comments: Soreness of the left lower rear molar.  Eyes:      General: No scleral icterus.     Extraocular Movements: Extraocular movements intact.      Conjunctiva/sclera: Conjunctivae normal.      Pupils: Pupils are equal, round, and reactive to light.   Neck:      Thyroid: No thyromegaly.      Vascular: No JVD.   Cardiovascular:      Rate and Rhythm: Normal rate and regular rhythm.      Heart sounds: Normal heart sounds. No murmur heard.     No friction rub. No gallop.   Pulmonary:      Effort: Pulmonary effort is normal. No respiratory distress.      Breath sounds: Normal breath sounds. No wheezing or rales.   Chest:      Chest wall: No tenderness.   Abdominal:      General: Abdomen is flat. Bowel sounds are normal. There is no distension.      Palpations: Abdomen is soft. There is no mass.       Tenderness: There is no abdominal tenderness. There is no rebound.   Musculoskeletal:         General: No swelling (Patient also with swelling and ecchymosis of the left knee.) or deformity. Normal range of motion.      Cervical back: Normal range of motion and neck supple.   Lymphadenopathy:      Cervical: No cervical adenopathy.   Skin:     General: Skin is warm and dry.      Coloration: Skin is not jaundiced.      Findings: No bruising or rash.   Neurological:      General: No focal deficit present.      Mental Status: She is alert and oriented to person, place, and time. Mental status is at baseline.      Cranial Nerves: No cranial nerve deficit.      Sensory: No sensory deficit.      Motor: No weakness.      Coordination: Coordination normal.      Gait: Gait normal.      Deep Tendon Reflexes: Reflexes normal.   Psychiatric:         Mood and Affect: Mood normal.         Behavior: Behavior normal.         Thought Content: Thought content normal.         Judgment: Judgment normal.         CBC -  Recent Labs     04/08/25 0757 02/26/24 1000 07/19/23  0903   WBC 4.6 6.5 5.6   HGB 10.9* 11.4* 11.0*   HCT 34.5* 36.2 35.1*    275 197   MCV 93.0 92 93       CMP -  Recent Labs     04/08/25 0757 02/26/24  1000 02/24/23  0944 05/10/21  0955 10/22/20  1000 06/24/20  0824 05/29/20  1013 06/04/19  0945    142 139   < >  --  143   < > 142   K 3.8 4.3 4.1   < >  --  4.0   < > 4.2    103 102   < >  --  105   < > 104   CO2 28 30 27   < >  --  31   < > 29   ANIONGAP 11 13 14   < >  --  11   < > 13   BUN 16 21 17   < >  --  17   < > 19   CREATININE 0.85 0.96 0.89   < >  --  0.82   < > 0.88   EGFR 74 64  --   --   --   --   --   --    MG  --   --   --   --  1.60 1.59*  --  1.79    < > = values in this interval not displayed.     Recent Labs     04/08/25 0757 02/26/24 1000 02/24/23  0944   ALBUMIN 4.4 4.2 4.3   ALKPHOS 47 65 64   ALT 17 16 19   AST 19 19 23   BILITOT 0.4 0.3 0.4       LIPID PANEL -   Recent  Labs     04/08/25  0757 02/26/24  1000 02/24/23  0944 06/23/22  0847 01/11/22  0855   CHOL 124 140 153 134 138   LDLCALC 54 61  --   --   --    LDLF  --   --  66 55 52   HDL 45* 48.8 50.3 47.0 44.0   TRIG 168* 149 186* 159* 209*       Recent Labs     04/08/25  0757 10/04/24  0910 07/02/24  1451   HGBA1C 6.0* 6.2* 6.1*       Lab Results   Component Value Date    URLQHINS94 607 04/08/2025       Lab Results   Component Value Date    VITD25 60 04/08/2025        Assessment/Plan   Problem List Items Addressed This Visit       Atrial fibrillation (Multi) - Primary    Overview   Chronic Coumadin 3 mg daily  INR 1.7 today         Relevant Orders    POCT INR manually resulted (Completed)    Hypertension, essential, benign    Morbid obesity with BMI of 45.0-49.9, adult (Multi)    Overview   Has lost 6 pounds since March 2025  Had lost 74 pounds with Mediterranean diet, Ozempic and Mounjaro  Approved for Ozempic 3/26/2024  Switched to Mounjaro 3/12/2025 wt 271        Continue current Coumadin meds.  RTC in one month for recheck, sooner should problems arise.      Patient continues to lose weight patient continues to lose weight with the Mounjaro currently at 10 mg weekly     Patient exhibiting no signs of depression and does not need treatment at this time.  Medication list reconciled.  Thank you for visiting today!        Professional services: Some of this note was completed using Dragon voice technology and sometimes the software misinterprets words. This may include unintended errors with respect to translation of words, typographical errors or grammar errors which may not have been identified prior to finalization of the chart note. Please take this into account when reading the note. Thank you.

## 2025-05-29 DIAGNOSIS — E78.5 HYPERLIPIDEMIA, UNSPECIFIED HYPERLIPIDEMIA TYPE: ICD-10-CM

## 2025-05-29 DIAGNOSIS — F32.A DEPRESSION, UNSPECIFIED DEPRESSION TYPE: ICD-10-CM

## 2025-05-29 RX ORDER — SERTRALINE HYDROCHLORIDE 100 MG/1
200 TABLET, FILM COATED ORAL NIGHTLY
Qty: 180 TABLET | Refills: 1 | Status: SHIPPED | OUTPATIENT
Start: 2025-05-29

## 2025-05-29 RX ORDER — ROSUVASTATIN CALCIUM 20 MG/1
20 TABLET, COATED ORAL EVERY EVENING
Qty: 90 TABLET | Refills: 1 | Status: SHIPPED | OUTPATIENT
Start: 2025-05-29

## 2025-05-29 NOTE — TELEPHONE ENCOUNTER
Pt last OV 5/28/2025  Next OV 7/1/2025    Requested Prescriptions     Pending Prescriptions Disp Refills    rosuvastatin (Crestor) 20 mg tablet [Pharmacy Med Name: Rosuvastatin Calcium 20 MG Oral Tablet] 90 tablet 1     Sig: TAKE 1 TABLET BY MOUTH ONCE DAILY IN THE EVENING    sertraline (Zoloft) 100 mg tablet [Pharmacy Med Name: Sertraline HCl 100 MG Oral Tablet] 180 tablet 1     Sig: TAKE 2 TABLETS BY MOUTH ONCE DAILY AT BEDTIME

## 2025-06-06 ENCOUNTER — TELEPHONE (OUTPATIENT)
Dept: PRIMARY CARE | Facility: CLINIC | Age: 70
End: 2025-06-06
Payer: MEDICARE

## 2025-06-06 DIAGNOSIS — Z79.4 TYPE 2 DIABETES MELLITUS WITHOUT COMPLICATION, WITH LONG-TERM CURRENT USE OF INSULIN: ICD-10-CM

## 2025-06-06 DIAGNOSIS — E11.9 TYPE 2 DIABETES MELLITUS WITHOUT COMPLICATION, WITH LONG-TERM CURRENT USE OF INSULIN: ICD-10-CM

## 2025-06-06 RX ORDER — TIRZEPATIDE 10 MG/.5ML
10 INJECTION, SOLUTION SUBCUTANEOUS WEEKLY
Qty: 2 ML | Refills: 0 | Status: SHIPPED | OUTPATIENT
Start: 2025-06-06

## 2025-06-06 NOTE — TELEPHONE ENCOUNTER
Pt called and stated she took her last Mounjaro shot today and has no refills.  She also said she has not heard from anyone about delivery.    Please reach out to the patient.

## 2025-06-13 ENCOUNTER — PHARMACY VISIT (OUTPATIENT)
Dept: PHARMACY | Facility: CLINIC | Age: 70
End: 2025-06-13
Payer: COMMERCIAL

## 2025-06-13 PROCEDURE — RXMED WILLOW AMBULATORY MEDICATION CHARGE

## 2025-06-18 ENCOUNTER — APPOINTMENT (OUTPATIENT)
Dept: PHARMACY | Facility: HOSPITAL | Age: 70
End: 2025-06-18
Payer: MEDICARE

## 2025-07-01 ENCOUNTER — APPOINTMENT (OUTPATIENT)
Dept: PRIMARY CARE | Facility: CLINIC | Age: 70
End: 2025-07-01
Payer: MEDICARE

## 2025-07-01 VITALS
BODY MASS INDEX: 46.95 KG/M2 | SYSTOLIC BLOOD PRESSURE: 140 MMHG | WEIGHT: 265 LBS | HEART RATE: 59 BPM | DIASTOLIC BLOOD PRESSURE: 72 MMHG | TEMPERATURE: 97 F | RESPIRATION RATE: 18 BRPM | OXYGEN SATURATION: 92 %

## 2025-07-01 DIAGNOSIS — I48.11 LONGSTANDING PERSISTENT ATRIAL FIBRILLATION (MULTI): Primary | ICD-10-CM

## 2025-07-01 DIAGNOSIS — Z79.01 ON COUMADIN FOR ATRIAL FIBRILLATION (MULTI): ICD-10-CM

## 2025-07-01 DIAGNOSIS — E66.01 MORBID OBESITY WITH BMI OF 45.0-49.9, ADULT (MULTI): ICD-10-CM

## 2025-07-01 DIAGNOSIS — E78.2 HYPERLIPIDEMIA, MIXED: ICD-10-CM

## 2025-07-01 DIAGNOSIS — E11.9 TYPE 2 DIABETES MELLITUS WITHOUT COMPLICATION, WITHOUT LONG-TERM CURRENT USE OF INSULIN: ICD-10-CM

## 2025-07-01 DIAGNOSIS — I10 HYPERTENSION, ESSENTIAL, BENIGN: ICD-10-CM

## 2025-07-01 DIAGNOSIS — F32.A DEPRESSION, UNSPECIFIED DEPRESSION TYPE: ICD-10-CM

## 2025-07-01 DIAGNOSIS — I48.91 ON COUMADIN FOR ATRIAL FIBRILLATION (MULTI): ICD-10-CM

## 2025-07-01 LAB
INR IN PPP BY COAGULATION ASSAY EXTERNAL: 3.1
POC INR: 3.1 (ref 0.9–1.1)

## 2025-07-01 PROCEDURE — 99214 OFFICE O/P EST MOD 30 MIN: CPT | Performed by: FAMILY MEDICINE

## 2025-07-01 PROCEDURE — 85610 PROTHROMBIN TIME: CPT | Performed by: FAMILY MEDICINE

## 2025-07-01 NOTE — PROGRESS NOTES
Subjective   Patient ID: Bree Elam is a 69 y.o. female who presents for Coagulation Disorder.    PT here for a 1 month follow up on PTINR.    HPI   Patient comes in today for checkup and monitoring of her medication. She has no voiced complaints and no evidence of bleeding or other problems from the Coumadin.    Patient continues to work with Mounjaro.  She is currently on 10 mg weekly but seems to have plateaued.  She has been at 265 pounds now for over a month.    Review of Systems   All other systems reviewed and are negative.      Objective   /72   Pulse 59   Temp 36.1 °C (97 °F)   Resp 18   Wt 120 kg (265 lb)   LMP  (LMP Unknown)   SpO2 92%   BMI 46.95 kg/m²     Physical Exam  Vitals reviewed.   Constitutional:       General: She is not in acute distress.     Appearance: She is well-developed. She is morbidly obese. She is not diaphoretic.   HENT:      Head: Normocephalic and atraumatic.      Right Ear: Tympanic membrane, ear canal and external ear normal.      Left Ear: Tympanic membrane, ear canal and external ear normal.      Nose: Nose normal.      Mouth/Throat:      Mouth: Mucous membranes are moist.      Pharynx: Oropharynx is clear.      Comments: Soreness of the left lower rear molar.  Eyes:      General: No scleral icterus.     Extraocular Movements: Extraocular movements intact.      Conjunctiva/sclera: Conjunctivae normal.      Pupils: Pupils are equal, round, and reactive to light.   Neck:      Thyroid: No thyromegaly.      Vascular: No JVD.   Cardiovascular:      Rate and Rhythm: Normal rate and regular rhythm.      Heart sounds: Normal heart sounds. No murmur heard.     No friction rub. No gallop.   Pulmonary:      Effort: Pulmonary effort is normal. No respiratory distress.      Breath sounds: Normal breath sounds. No wheezing or rales.   Chest:      Chest wall: No tenderness.   Abdominal:      General: Abdomen is flat. Bowel sounds are normal. There is no distension.       Palpations: Abdomen is soft. There is no mass.      Tenderness: There is no abdominal tenderness. There is no rebound.   Musculoskeletal:         General: No swelling (Patient also with swelling and ecchymosis of the left knee.) or deformity. Normal range of motion.      Cervical back: Normal range of motion and neck supple.   Lymphadenopathy:      Cervical: No cervical adenopathy.   Skin:     General: Skin is warm and dry.      Coloration: Skin is not jaundiced.      Findings: No bruising or rash.   Neurological:      General: No focal deficit present.      Mental Status: She is alert and oriented to person, place, and time. Mental status is at baseline.      Cranial Nerves: No cranial nerve deficit.      Sensory: No sensory deficit.      Motor: No weakness.      Coordination: Coordination normal.      Gait: Gait normal.      Deep Tendon Reflexes: Reflexes normal.   Psychiatric:         Mood and Affect: Mood normal.         Behavior: Behavior normal.         Thought Content: Thought content normal.         Judgment: Judgment normal.         Assessment/Plan   Problem List Items Addressed This Visit           ICD-10-CM    Atrial fibrillation (Multi) - Primary I48.91    Depression F32.A    Diabetes mellitus, type 2 (Multi) E11.9    Hypertension, essential, benign I10    Hyperlipidemia, mixed E78.2    Morbid obesity with BMI of 45.0-49.9, adult (Multi) E66.01, Z68.42    On Coumadin for atrial fibrillation (Multi) I48.91, Z79.01    Relevant Orders    Protime-INR (Completed)    POCT INR manually resulted (Completed)   Continue all current meds.  RTC in one month for recheck, sooner should problems arise.     Patient exhibiting no signs of depression and does not need treatment at this time.  Medication list reconciled.  Thank you for visiting today!        Professional services: Some of this note was completed using Dragon voice technology and sometimes the software misinterprets words. This may include unintended  errors with respect to translation of words, typographical errors or grammar errors which may not have been identified prior to finalization of the chart note. Please take this into account when reading the note. Thank you.

## 2025-07-05 PROBLEM — Z79.01 ON COUMADIN FOR ATRIAL FIBRILLATION (MULTI): Status: ACTIVE | Noted: 2025-07-05

## 2025-07-05 PROBLEM — I48.91 ON COUMADIN FOR ATRIAL FIBRILLATION (MULTI): Status: ACTIVE | Noted: 2025-07-05

## 2025-07-09 ENCOUNTER — APPOINTMENT (OUTPATIENT)
Dept: PHARMACY | Facility: HOSPITAL | Age: 70
End: 2025-07-09
Payer: MEDICARE

## 2025-07-14 ENCOUNTER — APPOINTMENT (OUTPATIENT)
Dept: PHARMACY | Facility: HOSPITAL | Age: 70
End: 2025-07-14
Payer: MEDICARE

## 2025-07-14 DIAGNOSIS — Z79.4 TYPE 2 DIABETES MELLITUS WITHOUT COMPLICATION, WITH LONG-TERM CURRENT USE OF INSULIN: ICD-10-CM

## 2025-07-14 DIAGNOSIS — E11.9 TYPE 2 DIABETES MELLITUS WITHOUT COMPLICATION, WITH LONG-TERM CURRENT USE OF INSULIN: ICD-10-CM

## 2025-07-14 RX ORDER — TIRZEPATIDE 12.5 MG/.5ML
12.5 INJECTION, SOLUTION SUBCUTANEOUS WEEKLY
Qty: 2 ML | Refills: 0 | Status: SHIPPED | OUTPATIENT
Start: 2025-07-14

## 2025-07-14 RX ORDER — TIRZEPATIDE 10 MG/.5ML
10 INJECTION, SOLUTION SUBCUTANEOUS WEEKLY
Qty: 2 ML | Refills: 0 | Status: SHIPPED | OUTPATIENT
Start: 2025-07-14 | End: 2025-07-14 | Stop reason: DRUGHIGH

## 2025-07-14 NOTE — PROGRESS NOTES
Subjective     Patient ID: Bree Elam is a 69 y.o. female who presents for diabetes management.    Diabetes  She presents for her follow-up diabetic visit. She has type 2 diabetes mellitus. Risk factors for coronary artery disease include dyslipidemia, obesity, hypertension and diabetes mellitus. Current diabetic treatment includes oral agent (dual therapy) and insulin injections. An ACE inhibitor/angiotensin II receptor blocker is not being taken.     Allergies   Allergen Reactions    Nsaids (Non-Steroidal Anti-Inflammatory Drug) Unknown     Objective     Current DM Pharmacotherapy:   -metformin 1,000 mg bid  -Mounjaro 10 mg weekly    SECONDARY PREVENTION  - Statin? Yes  - ACE-I/ARB? No  - Aspirin? Yes    Current monitoring regimen:   Patient is using: glucometer    SMBG Readings: 115 to 120 in the morning  Highest 170 in evening    Any episodes of hypoglycemia? No  Hypoglycemia awareness? No    Pertinent PMH Review:  - PMH of Pancreatitis: No  - PMH/FH of Medullary Thyroid Cancer: No  - PMH of Retinopathy: No  - PMH of Urinary Tract Infections: No    Lab Review  Lab Results   Component Value Date    BILITOT 0.4 04/08/2025    CALCIUM 9.3 04/08/2025    CO2 28 04/08/2025     04/08/2025    CREATININE 0.85 04/08/2025    GLUCOSE 134 (H) 04/08/2025    ALKPHOS 47 04/08/2025    K 3.8 04/08/2025    PROT 6.2 04/08/2025     04/08/2025    AST 19 04/08/2025    ALT 17 04/08/2025    BUN 16 04/08/2025    ANIONGAP 11 04/08/2025    MG 1.60 10/22/2020    ALBUMIN 4.4 04/08/2025    GFRF 70 02/24/2023     Lab Results   Component Value Date    TRIG 168 (H) 04/08/2025    CHOL 124 04/08/2025    LDLCALC 54 04/08/2025    HDL 45 (L) 04/08/2025     Lab Results   Component Value Date    HGBA1C 6.0 (H) 04/08/2025     The ASCVD Risk score (Jo-Ann RODRIGUEZ, et al., 2019) failed to calculate for the following reasons:    The valid total cholesterol range is 130 to 320 mg/dL    Assessment/Plan     Problem List Items Addressed This  Visit       Diabetes mellitus, type 2 (Multi)    Relevant Medications    tirzepatide (Mounjaro) 12.5 mg/0.5 mL pen injector   Patients had no issues with Mounjaro. Weight loss is still slow now. Will increase Mounjaro to 12.5 mg for better weight loss potential and will follow up in 4 weeks.    Type 2 diabetes mellitus, is at goal. <7%    INCREASE Mounjaro to 12.5 mg weekly  Follow up: I recommend diabetes care be 4 weeks    Romana Bolden, PharmD    Continue all meds under the continuation of care with the referring provider and clinical pharmacy team

## 2025-07-17 PROCEDURE — RXMED WILLOW AMBULATORY MEDICATION CHARGE

## 2025-07-18 ENCOUNTER — PHARMACY VISIT (OUTPATIENT)
Dept: PHARMACY | Facility: CLINIC | Age: 70
End: 2025-07-18
Payer: COMMERCIAL

## 2025-07-30 DIAGNOSIS — E11.9 TYPE 2 DIABETES MELLITUS WITHOUT COMPLICATION, WITHOUT LONG-TERM CURRENT USE OF INSULIN: ICD-10-CM

## 2025-07-30 RX ORDER — METFORMIN HYDROCHLORIDE 500 MG/1
TABLET ORAL
Qty: 360 TABLET | Refills: 0 | Status: SHIPPED | OUTPATIENT
Start: 2025-07-30

## 2025-08-03 DIAGNOSIS — I10 HYPERTENSION, UNSPECIFIED TYPE: ICD-10-CM

## 2025-08-04 ENCOUNTER — APPOINTMENT (OUTPATIENT)
Dept: PHARMACY | Facility: HOSPITAL | Age: 70
End: 2025-08-04
Payer: MEDICARE

## 2025-08-04 DIAGNOSIS — E11.9 TYPE 2 DIABETES MELLITUS WITHOUT COMPLICATION, WITH LONG-TERM CURRENT USE OF INSULIN: Primary | ICD-10-CM

## 2025-08-04 DIAGNOSIS — Z79.4 TYPE 2 DIABETES MELLITUS WITHOUT COMPLICATION, WITH LONG-TERM CURRENT USE OF INSULIN: Primary | ICD-10-CM

## 2025-08-05 ENCOUNTER — APPOINTMENT (OUTPATIENT)
Dept: PRIMARY CARE | Facility: CLINIC | Age: 70
End: 2025-08-05
Payer: MEDICARE

## 2025-08-05 RX ORDER — AMLODIPINE BESYLATE 10 MG/1
10 TABLET ORAL DAILY
Qty: 90 TABLET | Refills: 1 | Status: SHIPPED | OUTPATIENT
Start: 2025-08-05

## 2025-08-05 RX ORDER — SOTALOL HYDROCHLORIDE 120 MG/1
120 TABLET ORAL 2 TIMES DAILY
Qty: 180 TABLET | Refills: 1 | Status: SHIPPED | OUTPATIENT
Start: 2025-08-05

## 2025-08-05 RX ORDER — TIRZEPATIDE 15 MG/.5ML
15 INJECTION, SOLUTION SUBCUTANEOUS WEEKLY
Qty: 6 ML | Refills: 1 | Status: SHIPPED | OUTPATIENT
Start: 2025-08-05

## 2025-08-05 NOTE — TELEPHONE ENCOUNTER
Pt last OV 7/1/2025  Next OV 8/19/2025    Requested Prescriptions     Pending Prescriptions Disp Refills    amLODIPine (Norvasc) 10 mg tablet [Pharmacy Med Name: amLODIPine Besylate 10 MG Oral Tablet] 90 tablet 1     Sig: TAKE 1 TABLET BY MOUTH ONCE DAILY AS DIRECTED    sotalol (Betapace) 120 mg tablet [Pharmacy Med Name: SOTALOL HCL 120MG   TAB] 180 tablet 1     Sig: Take 1 tablet by mouth twice daily

## 2025-08-05 NOTE — PROGRESS NOTES
Subjective     Patient ID: Bree Elam is a 69 y.o. female who presents for diabetes management.    Diabetes  She presents for her follow-up diabetic visit. She has type 2 diabetes mellitus. Risk factors for coronary artery disease include dyslipidemia, obesity, hypertension and diabetes mellitus. Current diabetic treatment includes oral agent (dual therapy) and insulin injections. An ACE inhibitor/angiotensin II receptor blocker is not being taken.     Allergies   Allergen Reactions    Nsaids (Non-Steroidal Anti-Inflammatory Drug) Unknown     Objective     Current DM Pharmacotherapy:   -metformin 1,000 mg bid  -Mounjaro 12.5 mg weekly    SECONDARY PREVENTION  - Statin? Yes  - ACE-I/ARB? No  - Aspirin? Yes    Current monitoring regimen:   Patient is using: glucometer    SMBG Readings: 115 to 120 in the morning  Highest 170 in evening    Any episodes of hypoglycemia? No  Hypoglycemia awareness? No    Pertinent PMH Review:  - PMH of Pancreatitis: No  - PMH/FH of Medullary Thyroid Cancer: No  - PMH of Retinopathy: No  - PMH of Urinary Tract Infections: No    Lab Review  Lab Results   Component Value Date    BILITOT 0.4 04/08/2025    CALCIUM 9.3 04/08/2025    CO2 28 04/08/2025     04/08/2025    CREATININE 0.85 04/08/2025    GLUCOSE 134 (H) 04/08/2025    ALKPHOS 47 04/08/2025    K 3.8 04/08/2025    PROT 6.2 04/08/2025     04/08/2025    AST 19 04/08/2025    ALT 17 04/08/2025    BUN 16 04/08/2025    ANIONGAP 11 04/08/2025    MG 1.60 10/22/2020    ALBUMIN 4.4 04/08/2025    GFRF 70 02/24/2023     Lab Results   Component Value Date    TRIG 168 (H) 04/08/2025    CHOL 124 04/08/2025    LDLCALC 54 04/08/2025    HDL 45 (L) 04/08/2025     Lab Results   Component Value Date    HGBA1C 6.0 (H) 04/08/2025     The ASCVD Risk score (Jo-Ann RODRIGUEZ, et al., 2019) failed to calculate for the following reasons:    The valid total cholesterol range is 130 to 320 mg/dL    Assessment/Plan     Problem List Items Addressed  This Visit    None    Patients had no issues with Mounjaro. Weight loss is still slow now. Will increase Mounjaro to 15 mg for better weight loss potential and will follow up in 4 weeks.    Type 2 diabetes mellitus, is at goal. <7%    INCREASE Mounjaro to 15 mg weekly  Follow up: I recommend diabetes care be 4 weeks    Romana Bolden, PharmD    Continue all meds under the continuation of care with the referring provider and clinical pharmacy team

## 2025-08-11 PROCEDURE — RXMED WILLOW AMBULATORY MEDICATION CHARGE

## 2025-08-12 ENCOUNTER — PHARMACY VISIT (OUTPATIENT)
Dept: PHARMACY | Facility: CLINIC | Age: 70
End: 2025-08-12
Payer: COMMERCIAL

## 2025-08-19 ENCOUNTER — APPOINTMENT (OUTPATIENT)
Dept: PRIMARY CARE | Facility: CLINIC | Age: 70
End: 2025-08-19
Payer: MEDICARE

## 2025-08-19 VITALS
HEART RATE: 60 BPM | RESPIRATION RATE: 20 BRPM | BODY MASS INDEX: 46.07 KG/M2 | OXYGEN SATURATION: 93 % | TEMPERATURE: 98.3 F | WEIGHT: 260 LBS | SYSTOLIC BLOOD PRESSURE: 129 MMHG | DIASTOLIC BLOOD PRESSURE: 80 MMHG

## 2025-08-19 DIAGNOSIS — I48.11 LONGSTANDING PERSISTENT ATRIAL FIBRILLATION (MULTI): Primary | ICD-10-CM

## 2025-08-19 DIAGNOSIS — I10 HYPERTENSION, ESSENTIAL, BENIGN: ICD-10-CM

## 2025-08-19 DIAGNOSIS — E11.9 TYPE 2 DIABETES MELLITUS WITHOUT COMPLICATION, WITHOUT LONG-TERM CURRENT USE OF INSULIN: ICD-10-CM

## 2025-08-19 DIAGNOSIS — Z79.01 ON COUMADIN FOR ATRIAL FIBRILLATION (MULTI): ICD-10-CM

## 2025-08-19 DIAGNOSIS — I48.91 ON COUMADIN FOR ATRIAL FIBRILLATION (MULTI): ICD-10-CM

## 2025-08-19 DIAGNOSIS — D64.9 ANEMIA, UNSPECIFIED TYPE: ICD-10-CM

## 2025-08-19 DIAGNOSIS — G47.33 OSA ON CPAP: ICD-10-CM

## 2025-08-19 DIAGNOSIS — R73.9 HYPERGLYCEMIA: ICD-10-CM

## 2025-08-19 DIAGNOSIS — E66.01 MORBID OBESITY WITH BMI OF 45.0-49.9, ADULT (MULTI): ICD-10-CM

## 2025-08-19 LAB
INR PPP: 2
POC INR: 2.2 (ref 0.9–1.1)
PROTHROMBIN TIME: 20.4 SEC (ref 9–11.5)

## 2025-08-19 PROCEDURE — 99214 OFFICE O/P EST MOD 30 MIN: CPT | Performed by: FAMILY MEDICINE

## 2025-08-19 PROCEDURE — 85610 PROTHROMBIN TIME: CPT | Performed by: FAMILY MEDICINE

## 2025-08-20 LAB
ERYTHROCYTE [DISTWIDTH] IN BLOOD BY AUTOMATED COUNT: 14.2 % (ref 11–15)
EST. AVERAGE GLUCOSE BLD GHB EST-MCNC: 137 MG/DL
EST. AVERAGE GLUCOSE BLD GHB EST-SCNC: 7.6 MMOL/L
HBA1C MFR BLD: 6.4 %
HCT VFR BLD AUTO: 37.9 % (ref 35–45)
HGB BLD-MCNC: 12 G/DL (ref 11.7–15.5)
MCH RBC QN AUTO: 29.6 PG (ref 27–33)
MCHC RBC AUTO-ENTMCNC: 31.7 G/DL (ref 32–36)
MCV RBC AUTO: 93.3 FL (ref 80–100)
PLATELET # BLD AUTO: 174 THOUSAND/UL (ref 140–400)
PMV BLD REES-ECKER: 11.2 FL (ref 7.5–12.5)
RBC # BLD AUTO: 4.06 MILLION/UL (ref 3.8–5.1)
WBC # BLD AUTO: 4.3 THOUSAND/UL (ref 3.8–10.8)

## 2025-08-27 DIAGNOSIS — I10 HYPERTENSION, UNSPECIFIED TYPE: ICD-10-CM

## 2025-08-28 RX ORDER — HYDROCHLOROTHIAZIDE 25 MG/1
25 TABLET ORAL DAILY
Qty: 90 TABLET | Refills: 1 | Status: SHIPPED | OUTPATIENT
Start: 2025-08-28

## 2025-09-08 ENCOUNTER — APPOINTMENT (OUTPATIENT)
Dept: PHARMACY | Facility: HOSPITAL | Age: 70
End: 2025-09-08
Payer: MEDICARE

## 2025-09-19 ENCOUNTER — APPOINTMENT (OUTPATIENT)
Dept: PRIMARY CARE | Facility: CLINIC | Age: 70
End: 2025-09-19
Payer: MEDICARE